# Patient Record
Sex: FEMALE | Race: WHITE | Employment: OTHER | URBAN - METROPOLITAN AREA
[De-identification: names, ages, dates, MRNs, and addresses within clinical notes are randomized per-mention and may not be internally consistent; named-entity substitution may affect disease eponyms.]

---

## 2017-01-01 ENCOUNTER — TELEPHONE (OUTPATIENT)
Dept: FAMILY MEDICINE | Facility: CLINIC | Age: 82
End: 2017-01-01

## 2017-01-01 ENCOUNTER — ANTICOAGULATION THERAPY VISIT (OUTPATIENT)
Dept: NURSING | Facility: CLINIC | Age: 82
End: 2017-01-01
Payer: MEDICARE

## 2017-01-01 ENCOUNTER — MYC MEDICAL ADVICE (OUTPATIENT)
Dept: FAMILY MEDICINE | Facility: CLINIC | Age: 82
End: 2017-01-01

## 2017-01-01 ENCOUNTER — APPOINTMENT (OUTPATIENT)
Dept: CT IMAGING | Facility: CLINIC | Age: 82
DRG: 065 | End: 2017-01-01
Attending: EMERGENCY MEDICINE
Payer: MEDICARE

## 2017-01-01 ENCOUNTER — ANTICOAGULATION THERAPY VISIT (OUTPATIENT)
Dept: NURSING | Facility: CLINIC | Age: 82
End: 2017-01-01

## 2017-01-01 ENCOUNTER — OFFICE VISIT (OUTPATIENT)
Dept: FAMILY MEDICINE | Facility: CLINIC | Age: 82
End: 2017-01-01
Payer: MEDICARE

## 2017-01-01 ENCOUNTER — HOSPITAL ENCOUNTER (INPATIENT)
Facility: CLINIC | Age: 82
LOS: 2 days | Discharge: HOSPICE | DRG: 065 | End: 2017-08-18
Attending: EMERGENCY MEDICINE | Admitting: HOSPITALIST
Payer: MEDICARE

## 2017-01-01 ENCOUNTER — TELEPHONE (OUTPATIENT)
Dept: NURSING | Facility: CLINIC | Age: 82
End: 2017-01-01

## 2017-01-01 ENCOUNTER — DOCUMENTATION ONLY (OUTPATIENT)
Dept: OTHER | Facility: CLINIC | Age: 82
End: 2017-01-01

## 2017-01-01 ENCOUNTER — TRANSFERRED RECORDS (OUTPATIENT)
Dept: HEALTH INFORMATION MANAGEMENT | Facility: CLINIC | Age: 82
End: 2017-01-01

## 2017-01-01 VITALS
OXYGEN SATURATION: 97 % | TEMPERATURE: 97.6 F | DIASTOLIC BLOOD PRESSURE: 84 MMHG | SYSTOLIC BLOOD PRESSURE: 124 MMHG | WEIGHT: 147 LBS | HEART RATE: 82 BPM | BODY MASS INDEX: 26.05 KG/M2 | HEIGHT: 63 IN

## 2017-01-01 VITALS
DIASTOLIC BLOOD PRESSURE: 80 MMHG | BODY MASS INDEX: 27.07 KG/M2 | WEIGHT: 152.75 LBS | SYSTOLIC BLOOD PRESSURE: 118 MMHG | TEMPERATURE: 97.9 F | HEIGHT: 63 IN | HEART RATE: 76 BPM | OXYGEN SATURATION: 97 %

## 2017-01-01 VITALS
DIASTOLIC BLOOD PRESSURE: 72 MMHG | RESPIRATION RATE: 16 BRPM | WEIGHT: 157 LBS | SYSTOLIC BLOOD PRESSURE: 145 MMHG | BODY MASS INDEX: 28.03 KG/M2 | OXYGEN SATURATION: 97 % | TEMPERATURE: 98.2 F

## 2017-01-01 VITALS
DIASTOLIC BLOOD PRESSURE: 80 MMHG | TEMPERATURE: 97.6 F | WEIGHT: 150.75 LBS | BODY MASS INDEX: 26.71 KG/M2 | HEIGHT: 63 IN | HEART RATE: 78 BPM | SYSTOLIC BLOOD PRESSURE: 118 MMHG | OXYGEN SATURATION: 99 %

## 2017-01-01 DIAGNOSIS — R41.3 MEMORY LOSS: Primary | ICD-10-CM

## 2017-01-01 DIAGNOSIS — J98.01 ACUTE BRONCHOSPASM: Primary | ICD-10-CM

## 2017-01-01 DIAGNOSIS — Z79.01 LONG-TERM (CURRENT) USE OF ANTICOAGULANTS: Primary | ICD-10-CM

## 2017-01-01 DIAGNOSIS — J20.9 ACUTE BRONCHITIS, UNSPECIFIED ORGANISM: Primary | ICD-10-CM

## 2017-01-01 DIAGNOSIS — M79.89 LEG SWELLING: ICD-10-CM

## 2017-01-01 DIAGNOSIS — I48.91 ATRIAL FIBRILLATION WITH RAPID VENTRICULAR RESPONSE (H): ICD-10-CM

## 2017-01-01 DIAGNOSIS — I63.9 STROKE (H): ICD-10-CM

## 2017-01-01 DIAGNOSIS — Z79.01 LONG-TERM (CURRENT) USE OF ANTICOAGULANTS: ICD-10-CM

## 2017-01-01 DIAGNOSIS — Z51.5 END OF LIFE CARE: Primary | ICD-10-CM

## 2017-01-01 DIAGNOSIS — Z53.9 DIAGNOSIS NOT YET DEFINED: Primary | ICD-10-CM

## 2017-01-01 DIAGNOSIS — R53.81 PHYSICAL DECONDITIONING: Primary | ICD-10-CM

## 2017-01-01 DIAGNOSIS — R41.3 MEMORY LOSS: ICD-10-CM

## 2017-01-01 DIAGNOSIS — I48.91 ATRIAL FIBRILLATION (H): ICD-10-CM

## 2017-01-01 DIAGNOSIS — H90.3 SENSORY HEARING LOSS, BILATERAL: ICD-10-CM

## 2017-01-01 DIAGNOSIS — E78.2 MIXED HYPERLIPIDEMIA: ICD-10-CM

## 2017-01-01 DIAGNOSIS — I48.91 ATRIAL FIBRILLATION WITH RAPID VENTRICULAR RESPONSE (H): Primary | ICD-10-CM

## 2017-01-01 DIAGNOSIS — Z71.89 ADVANCED DIRECTIVES, COUNSELING/DISCUSSION: Chronic | ICD-10-CM

## 2017-01-01 LAB
ANION GAP SERPL CALCULATED.3IONS-SCNC: 10 MMOL/L (ref 3–14)
APTT PPP: 26 SEC (ref 22–37)
BASOPHILS # BLD AUTO: 0 10E9/L (ref 0–0.2)
BASOPHILS NFR BLD AUTO: 0.2 %
BUN SERPL-MCNC: 20 MG/DL (ref 7–30)
CALCIUM SERPL-MCNC: 8.6 MG/DL (ref 8.5–10.1)
CHLORIDE SERPL-SCNC: 101 MMOL/L (ref 94–109)
CO2 SERPL-SCNC: 25 MMOL/L (ref 20–32)
CREAT SERPL-MCNC: 1.12 MG/DL (ref 0.52–1.04)
DIFFERENTIAL METHOD BLD: NORMAL
EOSINOPHIL # BLD AUTO: 0.1 10E9/L (ref 0–0.7)
EOSINOPHIL NFR BLD AUTO: 1.3 %
ERYTHROCYTE [DISTWIDTH] IN BLOOD BY AUTOMATED COUNT: 13 % (ref 10–15)
GFR SERPL CREATININE-BSD FRML MDRD: 45 ML/MIN/1.7M2
GLUCOSE SERPL-MCNC: 131 MG/DL (ref 70–99)
HCT VFR BLD AUTO: 40.7 % (ref 35–47)
HGB BLD-MCNC: 13.9 G/DL (ref 11.7–15.7)
IMM GRANULOCYTES # BLD: 0 10E9/L (ref 0–0.4)
IMM GRANULOCYTES NFR BLD: 0.2 %
INR POINT OF CARE: 2.8 (ref 0.86–1.14)
INR PPP: 1.07 (ref 0.86–1.14)
INR PPP: 1.8
INTERPRETATION ECG - MUSE: NORMAL
LYMPHOCYTES # BLD AUTO: 1.8 10E9/L (ref 0.8–5.3)
LYMPHOCYTES NFR BLD AUTO: 17 %
MCH RBC QN AUTO: 32.1 PG (ref 26.5–33)
MCHC RBC AUTO-ENTMCNC: 34.2 G/DL (ref 31.5–36.5)
MCV RBC AUTO: 94 FL (ref 78–100)
MONOCYTES # BLD AUTO: 1 10E9/L (ref 0–1.3)
MONOCYTES NFR BLD AUTO: 9.9 %
NEUTROPHILS # BLD AUTO: 7.4 10E9/L (ref 1.6–8.3)
NEUTROPHILS NFR BLD AUTO: 71.4 %
NRBC # BLD AUTO: 0 10*3/UL
NRBC BLD AUTO-RTO: 0 /100
PLATELET # BLD AUTO: 219 10E9/L (ref 150–450)
POTASSIUM SERPL-SCNC: 3.8 MMOL/L (ref 3.4–5.3)
RBC # BLD AUTO: 4.33 10E12/L (ref 3.8–5.2)
SODIUM SERPL-SCNC: 136 MMOL/L (ref 133–144)
WBC # BLD AUTO: 10.3 10E9/L (ref 4–11)

## 2017-01-01 PROCEDURE — 70460 CT HEAD/BRAIN W/DYE: CPT

## 2017-01-01 PROCEDURE — G0180 MD CERTIFICATION HHA PATIENT: HCPCS | Performed by: FAMILY MEDICINE

## 2017-01-01 PROCEDURE — 99223 1ST HOSP IP/OBS HIGH 75: CPT | Mod: AI | Performed by: HOSPITALIST

## 2017-01-01 PROCEDURE — 25000128 H RX IP 250 OP 636: Performed by: EMERGENCY MEDICINE

## 2017-01-01 PROCEDURE — A9270 NON-COVERED ITEM OR SERVICE: HCPCS | Mod: GY | Performed by: HOSPITALIST

## 2017-01-01 PROCEDURE — 12000000 ZZH R&B MED SURG/OB

## 2017-01-01 PROCEDURE — 99214 OFFICE O/P EST MOD 30 MIN: CPT | Performed by: FAMILY MEDICINE

## 2017-01-01 PROCEDURE — 99231 SBSQ HOSP IP/OBS SF/LOW 25: CPT | Performed by: INTERNAL MEDICINE

## 2017-01-01 PROCEDURE — 85610 PROTHROMBIN TIME: CPT | Performed by: EMERGENCY MEDICINE

## 2017-01-01 PROCEDURE — 99291 CRITICAL CARE FIRST HOUR: CPT | Mod: 25

## 2017-01-01 PROCEDURE — 99215 OFFICE O/P EST HI 40 MIN: CPT | Performed by: FAMILY MEDICINE

## 2017-01-01 PROCEDURE — 25000132 ZZH RX MED GY IP 250 OP 250 PS 637: Mod: GY | Performed by: HOSPITALIST

## 2017-01-01 PROCEDURE — 80048 BASIC METABOLIC PNL TOTAL CA: CPT | Performed by: EMERGENCY MEDICINE

## 2017-01-01 PROCEDURE — 99207 ZZC NO CHARGE NURSE ONLY: CPT

## 2017-01-01 PROCEDURE — 25000125 ZZHC RX 250: Performed by: EMERGENCY MEDICINE

## 2017-01-01 PROCEDURE — 93005 ELECTROCARDIOGRAM TRACING: CPT

## 2017-01-01 PROCEDURE — 70470 CT HEAD/BRAIN W/O & W/DYE: CPT | Mod: XS

## 2017-01-01 PROCEDURE — 85025 COMPLETE CBC W/AUTO DIFF WBC: CPT | Performed by: EMERGENCY MEDICINE

## 2017-01-01 PROCEDURE — 70498 CT ANGIOGRAPHY NECK: CPT

## 2017-01-01 PROCEDURE — 99238 HOSP IP/OBS DSCHRG MGMT 30/<: CPT | Performed by: INTERNAL MEDICINE

## 2017-01-01 PROCEDURE — 99207 ZZC NO CHARGE NURSE ONLY: CPT | Performed by: FAMILY MEDICINE

## 2017-01-01 PROCEDURE — 36416 COLLJ CAPILLARY BLOOD SPEC: CPT

## 2017-01-01 PROCEDURE — A9270 NON-COVERED ITEM OR SERVICE: HCPCS | Mod: GY | Performed by: EMERGENCY MEDICINE

## 2017-01-01 PROCEDURE — 85610 PROTHROMBIN TIME: CPT | Mod: QW

## 2017-01-01 PROCEDURE — 25000132 ZZH RX MED GY IP 250 OP 250 PS 637: Mod: GY | Performed by: EMERGENCY MEDICINE

## 2017-01-01 PROCEDURE — 85730 THROMBOPLASTIN TIME PARTIAL: CPT | Performed by: EMERGENCY MEDICINE

## 2017-01-01 RX ORDER — ATROPINE SULFATE 10 MG/ML
1-2 SOLUTION/ DROPS OPHTHALMIC
Status: DISCONTINUED | OUTPATIENT
Start: 2017-01-01 | End: 2017-01-01 | Stop reason: HOSPADM

## 2017-01-01 RX ORDER — HALOPERIDOL 2 MG/ML
.5-1 SOLUTION ORAL EVERY 6 HOURS PRN
Qty: 30 ML | Refills: 1 | DISCHARGE
Start: 2017-01-01

## 2017-01-01 RX ORDER — FUROSEMIDE 20 MG
10 TABLET ORAL EVERY MORNING
Qty: 45 TABLET | Refills: 0 | Status: SHIPPED | OUTPATIENT
Start: 2017-01-01 | End: 2017-01-01

## 2017-01-01 RX ORDER — TRIAMCINOLONE ACETONIDE 1 MG/G
CREAM TOPICAL 3 TIMES DAILY PRN
COMMUNITY

## 2017-01-01 RX ORDER — ATROPINE SULFATE 10 MG/ML
2-4 SOLUTION/ DROPS OPHTHALMIC
Qty: 5 ML | Refills: 1 | DISCHARGE
Start: 2017-01-01

## 2017-01-01 RX ORDER — LORAZEPAM 2 MG/ML
.5-1 INJECTION INTRAMUSCULAR
Status: DISCONTINUED | OUTPATIENT
Start: 2017-01-01 | End: 2017-01-01 | Stop reason: HOSPADM

## 2017-01-01 RX ORDER — NALOXONE HYDROCHLORIDE 0.4 MG/ML
.1-.4 INJECTION, SOLUTION INTRAMUSCULAR; INTRAVENOUS; SUBCUTANEOUS
Status: DISCONTINUED | OUTPATIENT
Start: 2017-01-01 | End: 2017-01-01

## 2017-01-01 RX ORDER — ASPIRIN 300 MG/1
300 SUPPOSITORY RECTAL ONCE
Status: COMPLETED | OUTPATIENT
Start: 2017-01-01 | End: 2017-01-01

## 2017-01-01 RX ORDER — ACETAMINOPHEN 650 MG/1
650 SUPPOSITORY RECTAL EVERY 4 HOURS PRN
Status: DISCONTINUED | OUTPATIENT
Start: 2017-01-01 | End: 2017-01-01 | Stop reason: HOSPADM

## 2017-01-01 RX ORDER — ONDANSETRON 2 MG/ML
4 INJECTION INTRAMUSCULAR; INTRAVENOUS EVERY 6 HOURS PRN
Status: DISCONTINUED | OUTPATIENT
Start: 2017-01-01 | End: 2017-01-01 | Stop reason: HOSPADM

## 2017-01-01 RX ORDER — LORAZEPAM 2 MG/ML
.25-.5 CONCENTRATE ORAL EVERY 4 HOURS PRN
Qty: 30 ML | Refills: 1 | Status: SHIPPED | DISCHARGE
Start: 2017-01-01

## 2017-01-01 RX ORDER — ALBUTEROL SULFATE 0.83 MG/ML
2.5 SOLUTION RESPIRATORY (INHALATION)
Status: DISCONTINUED | OUTPATIENT
Start: 2017-01-01 | End: 2017-01-01 | Stop reason: HOSPADM

## 2017-01-01 RX ORDER — IOPAMIDOL 755 MG/ML
120 INJECTION, SOLUTION INTRAVASCULAR ONCE
Status: COMPLETED | OUTPATIENT
Start: 2017-01-01 | End: 2017-01-01

## 2017-01-01 RX ORDER — FUROSEMIDE 20 MG
10 TABLET ORAL EVERY MORNING
Qty: 45 TABLET | Refills: 0 | Status: ON HOLD | OUTPATIENT
Start: 2017-01-01 | End: 2017-01-01

## 2017-01-01 RX ORDER — ACETAMINOPHEN 325 MG/1
650 TABLET ORAL EVERY 4 HOURS PRN
Status: DISCONTINUED | OUTPATIENT
Start: 2017-01-01 | End: 2017-01-01 | Stop reason: HOSPADM

## 2017-01-01 RX ORDER — PROCHLORPERAZINE MALEATE 5 MG
5 TABLET ORAL EVERY 6 HOURS PRN
Status: DISCONTINUED | OUTPATIENT
Start: 2017-01-01 | End: 2017-01-01 | Stop reason: HOSPADM

## 2017-01-01 RX ORDER — LORAZEPAM 0.5 MG/1
.5-1 TABLET ORAL
Status: DISCONTINUED | OUTPATIENT
Start: 2017-01-01 | End: 2017-01-01 | Stop reason: HOSPADM

## 2017-01-01 RX ORDER — ONDANSETRON 4 MG/1
4 TABLET, ORALLY DISINTEGRATING ORAL EVERY 6 HOURS PRN
Status: DISCONTINUED | OUTPATIENT
Start: 2017-01-01 | End: 2017-01-01 | Stop reason: HOSPADM

## 2017-01-01 RX ORDER — NALOXONE HYDROCHLORIDE 0.4 MG/ML
.1-.4 INJECTION, SOLUTION INTRAMUSCULAR; INTRAVENOUS; SUBCUTANEOUS
Status: DISCONTINUED | OUTPATIENT
Start: 2017-01-01 | End: 2017-01-01 | Stop reason: HOSPADM

## 2017-01-01 RX ORDER — ACETAMINOPHEN 650 MG/1
650 SUPPOSITORY RECTAL EVERY 4 HOURS PRN
Qty: 12 SUPPOSITORY | Refills: 1 | DISCHARGE
Start: 2017-01-01

## 2017-01-01 RX ORDER — ATORVASTATIN CALCIUM 40 MG/1
40 TABLET, FILM COATED ORAL DAILY
Qty: 90 TABLET | Refills: 1 | Status: ON HOLD | OUTPATIENT
Start: 2017-01-01 | End: 2017-01-01

## 2017-01-01 RX ORDER — METOPROLOL TARTRATE 50 MG
100 TABLET ORAL 2 TIMES DAILY
Qty: 360 TABLET | Refills: 1 | Status: ON HOLD | OUTPATIENT
Start: 2017-01-01 | End: 2017-01-01

## 2017-01-01 RX ORDER — ACETAMINOPHEN 325 MG/1
325-650 TABLET ORAL EVERY 4 HOURS PRN
Qty: 100 TABLET | Refills: 1 | DISCHARGE
Start: 2017-01-01

## 2017-01-01 RX ORDER — BISACODYL 10 MG
10 SUPPOSITORY, RECTAL RECTAL DAILY PRN
Status: DISCONTINUED | OUTPATIENT
Start: 2017-01-01 | End: 2017-01-01 | Stop reason: HOSPADM

## 2017-01-01 RX ORDER — AZITHROMYCIN 250 MG/1
TABLET, FILM COATED ORAL
Qty: 6 TABLET | Refills: 0 | Status: ON HOLD | OUTPATIENT
Start: 2017-01-01 | End: 2017-01-01

## 2017-01-01 RX ORDER — HYDROMORPHONE HYDROCHLORIDE 1 MG/ML
.3-.5 INJECTION, SOLUTION INTRAMUSCULAR; INTRAVENOUS; SUBCUTANEOUS
Status: DISCONTINUED | OUTPATIENT
Start: 2017-01-01 | End: 2017-01-01 | Stop reason: HOSPADM

## 2017-01-01 RX ORDER — HYDROMORPHONE HYDROCHLORIDE 1 MG/ML
1-2 SOLUTION ORAL
Qty: 30 ML | Refills: 0 | Status: SHIPPED | DISCHARGE
Start: 2017-01-01

## 2017-01-01 RX ORDER — OXYCODONE HCL 20 MG/ML
5-7.5 CONCENTRATE, ORAL ORAL
Status: DISCONTINUED | OUTPATIENT
Start: 2017-01-01 | End: 2017-01-01 | Stop reason: HOSPADM

## 2017-01-01 RX ORDER — PROCHLORPERAZINE 25 MG
12.5 SUPPOSITORY, RECTAL RECTAL EVERY 12 HOURS PRN
Status: DISCONTINUED | OUTPATIENT
Start: 2017-01-01 | End: 2017-01-01 | Stop reason: HOSPADM

## 2017-01-01 RX ORDER — ALBUTEROL SULFATE 90 UG/1
2 AEROSOL, METERED RESPIRATORY (INHALATION) EVERY 6 HOURS PRN
Qty: 1 INHALER | Refills: 0 | Status: SHIPPED | OUTPATIENT
Start: 2017-01-01

## 2017-01-01 RX ORDER — DILTIAZEM HYDROCHLORIDE 60 MG/1
60 CAPSULE, EXTENDED RELEASE ORAL 2 TIMES DAILY
Qty: 180 CAPSULE | Refills: 0 | Status: ON HOLD | OUTPATIENT
Start: 2017-01-01 | End: 2017-01-01

## 2017-01-01 RX ORDER — SALIVA STIMULANT COMB. NO.3
1 SPRAY, NON-AEROSOL (ML) MUCOUS MEMBRANE
Status: DISCONTINUED | OUTPATIENT
Start: 2017-01-01 | End: 2017-01-01 | Stop reason: HOSPADM

## 2017-01-01 RX ADMIN — IOPAMIDOL 120 ML: 755 INJECTION, SOLUTION INTRAVENOUS at 23:13

## 2017-01-01 RX ADMIN — ASPIRIN 300 MG: 300 SUPPOSITORY RECTAL at 00:52

## 2017-01-01 RX ADMIN — SODIUM CHLORIDE 100 ML: 9 INJECTION, SOLUTION INTRAVENOUS at 23:12

## 2017-01-01 RX ADMIN — Medication 5 MG: at 22:38

## 2017-01-04 PROBLEM — R41.3 MEMORY LOSS: Status: ACTIVE | Noted: 2017-01-01

## 2017-01-04 NOTE — PROGRESS NOTES
SUBJECTIVE:                                                    Gisela Abad is a 89 year old female who presents to clinic today for the following health issues:    Discuss Hospice/Forgetful       Duration: 3 months ago    Description (location/character/radiation): pt's son states pt is having more confusion, sleeping more    Accompanying signs and symptoms: has to have meals prepared for her and forgetting to take medications    Would like to discuss Hospice      Family prepares meal. Lately significantly forgetful.  Per patient - does not like to do activities on certain days.    When family was out - it was very stressful for her as family provides her meals and she has hard time preparing her own meals.    Her son and daughter in law are primary caretakers. They have discussed assisted living facility but she declined. Patient sometime have thoughts of better of dead or life is not worth living thoughts. Does not remember to take meds on regular basis and family has to remind her.    Here with her son - per son - gets agitated easily. Needs frequent calls to take her meds. Sometime morning pills are taken during evening hours. Generally still takes pills on daily basis as son sets up in pill box.   They have talked to  at [facility] and they were thinking about hospice but patient is not in agreement. At [facility] there is no assisted living facility or other facility. She has been living at [facility] for 18 yrs and she wishes to stick to same.   Family arranged an aid for her to visit her twice per day.                                                                                                                                                                                          Problem list and histories reviewed & adjusted, as indicated.  Additional history: as documented    Problem list, Medication list, Allergies, and Medical/Social/Surgical histories reviewed in [facility] and  "updated as appropriate.    Social History     Social History     Marital Status:      Spouse Name: N/A     Number of Children: N/A     Years of Education: N/A     Social History Main Topics     Smoking status: Never Smoker      Smokeless tobacco: Never Used     Alcohol Use: Yes      Comment: rare     Drug Use: No     Sexual Activity: Not Currently     Other Topics Concern     Parent/Sibling W/ Cabg, Mi Or Angioplasty Before 65f 55m? No     Social History Narrative     Allergies   Allergen Reactions     Amoxicillin      Sulfa Drugs      Patient Active Problem List   Diagnosis     Mixed hyperlipidemia     Essential hypertension, benign     Esophageal reflux     Atrial fibrillation with rapid ventricular response (H)     Elevated troponin     Atrial fibrillation (H)     Osteoporosis     Mixed hearing loss     Sensory hearing loss, bilateral     Long-term (current) use of anticoagulants [Z79.01]     Reviewed medications, social history and  past medical and surgical history.    Review of system: for general, respiratory, CVS, GI and psychiatry negative except for noted above.     EXAM:  /80 mmHg  Pulse 78  Temp(Src) 97.6  F (36.4  C) (Oral)  Ht 5' 2.75\" (1.594 m)  Wt 150 lb 12 oz (68.38 kg)  BMI 26.91 kg/m2  SpO2 99%  Constitutional: healthy, alert and no distress   Psychiatric: somewhat confused but pleasant.   Neuro - alert, oriented to place.    Can not recall any of 3 words recall, unable to identify date, day, year. Unable to tell how many pills she takes but stated she takes twice per day from pill box.     ASSESSMENT / PLAN:  (R41.3) Memory loss  (primary encounter diagnosis)  Comment: it is fortunate that she has a great family support but I do believe she needs more help and her son wants same thing for her. I discussed at assisted living facility she would still have most of her independence but she may need more monitoring specially with declining of her function and ADL. We also discussed " about hearing loss, underlying occult depression can contribute to her memory loss and we discussed various plans including moving to assisted living or cutting down her meds (coumadin specifically) to improve compliance and avoid complications, involvement of care coordinator,  etc, we talked about hospice too.   Plan: she is not quiet sure about any of the options. She did get information about assisted living which I think would be a great option for her but as per son she is not too excited. I will see her back in a month to make sure she is doing OK. I do worry about her living alone at home. Son and daughter in law visits her frequently and an aid is visiting her twice per day too.     (I48.91) Atrial fibrillation with rapid ventricular response (H)  Comment:    Plan: on coumadin. See above. May need to stop it to avoid complications.     (H90.3) Sensory hearing loss, bilateral  Comment:    Plan: can worsen her mentation. Should continue to use hearing aids which she does not on regular basis as per son.     (Z79.01) Long-term (current) use of anticoagulants [Z79.01]  Comment:     Plan:      Follow up in a month.    I spent > 40 minutes and more than 1/2 of the time was in counselling and coordination of care regarding above mentioned issues.

## 2017-01-04 NOTE — MR AVS SNAPSHOT
After Visit Summary   1/4/2017    Gisela Abad    MRN: [id number]           Patient Information     Date Of Birth          12/12/1924        Visit Information        Provider Department      1/4/2017 9:40 AM Mike Kwan MD [facility]         Follow-ups after your visit        Your next 10 appointments already scheduled     Jan 09, 2017 11:15 AM   Anticoagulation Visit with  INR CLINIC   [facility]    [address] [place] [id number] [phone]            Feb 03, 2017  9:40 AM   Office Visit with Mike Kwan MD   [facility]    [address] [place] [id number] [phone]           Bring a current list of meds and any records pertaining to this visit.  For Physicals, please bring immunization records and any forms needing to be filled out.  Please arrive 10 minutes early to complete paperwork.              Who to contact     If you have questions or need follow up information about today's clinic visit or your schedule please contact [facility] directly at [phone].  Normal or non-critical lab and imaging results will be communicated to you by MyChart, letter or phone within 4 business days after the clinic has received the results. If you do not hear from us within 7 days, please contact the clinic through [employer] or phone. If you have a critical or abnormal lab result, we will notify you by phone as soon as possible.  Submit refill requests through [employer] or call your pharmacy and they will forward the refill request to us. Please allow 3 business days for your refill to be completed.          Additional Information About Your Visit        [employer] Information     [employer] gives you secure access to your electronic health record. If you see a primary care provider, you can also send messages to your care  "team and make appointments. If you have questions, please call your primary care clinic.  If you do not have a primary care provider, please call [phone] and they will assist you.        Care EveryWhere ID     This is your Care EveryWhere ID. This could be used by other organizations to access your [facility] medical records  [id number]        Your Vitals Were     Pulse Temperature Height BMI (Body Mass Index) Pulse Oximetry       78 97.6  F (36.4  C) (Oral) 5' 2.75\" (1.594 m) 26.91 kg/m2 99%        Blood Pressure from Last 3 Encounters:   01/04/17 118/80   09/23/16 118/66   08/11/16 126/77    Weight from Last 3 Encounters:   01/04/17 150 lb 12 oz (68.38 kg)   09/23/16 153 lb (69.4 kg)   08/11/16 156 lb (70.761 kg)              Today, you had the following     No orders found for display       Primary Care Provider Office Phone # Fax #    Mike Maria Teresa Kwan [phone]       [address] [address]        Thank you!     Thank you for choosing [facility]  for your care. Our goal is always to provide you with excellent care. Hearing back from our patients is one way we can continue to improve our services. Please take a few minutes to complete the written survey that you may receive in the mail after your visit with us. Thank you!             Your Updated Medication List - Protect others around you: Learn how to safely use, store and throw away your medicines at www.disposemymeds.org.          This list is accurate as of: 1/4/17 10:19 AM.  Always use your most recent med list.                   Brand Name Dispense Instructions for use    aspirin 81 MG tablet     100    1 tab po QD (Once per day)       atorvastatin 40 MG tablet    LIPITOR    90 tablet    Take 1 tablet (40 mg) by mouth daily       CENTRUM SILVER per tablet      Take 1 tablet by mouth daily       diltiazem 60 MG 12 hr capsule    CARDIZEM SR    180 capsule    Take 1 " capsule (60 mg) by mouth 2 times daily       furosemide 20 MG tablet    LASIX    45 tablet    Take 0.5 tablets (10 mg) by mouth every morning       metoprolol 50 MG tablet    LOPRESSOR    360 tablet    Take 2 tablets (100 mg) by mouth 2 times daily       triamcinolone 0.1 % cream    KENALOG    30 g    Apply sparingly to affected area three times daily for 14 days.       VITAMIN D PO          warfarin 2 MG tablet    COUMADIN    90 tablet    Take as directed by Anticoagulation Clinic. (Current dose: 1 mg on Fri; 2 mg all other days)

## 2017-01-04 NOTE — NURSING NOTE
"Chief Complaint   Patient presents with     Home Care/Hospice     discuss        Initial /80 mmHg  Pulse 78  Temp(Src) 97.6  F (36.4  C) (Oral)  Ht 5' 2.75\" (1.594 m)  Wt 150 lb 12 oz (68.38 kg)  BMI 26.91 kg/m2  SpO2 99% Estimated body mass index is 26.91 kg/(m^2) as calculated from the following:    Height as of this encounter: 5' 2.75\" (1.594 m).    Weight as of this encounter: 150 lb 12 oz (68.38 kg).  BP completed using cuff size: jayy Olivares CMA      "

## 2017-01-09 NOTE — PROGRESS NOTES
ANTICOAGULATION FOLLOW-UP CLINIC VISIT    Patient Name:  Gisela Abad  Date:  1/9/2017  Contact Type:  Face to Face    SUBJECTIVE:     Patient Findings     Positives Missed doses (Missed a dose last week, son believes it was 1/5/17)    Comments Rosalio Loredo is working on getting homecare services for patient. Likely HC team will be out next week to assess care needed and obtain an INR level.            OBJECTIVE    INR PROTIME   Date Value Ref Range Status   01/09/2017 2.8* 0.86 - 1.14 Final       ASSESSMENT / PLAN  INR assessment THER    Recheck INR In: 3 WEEKS    INR Location Clinic      Anticoagulation Summary as of 1/9/2017     INR goal 2.0-3.0   Selected INR 2.8 (1/9/2017)   Maintenance plan 1 mg (2 mg x 0.5) on Mon, Fri; 2 mg (2 mg x 1) all other days   Full instructions 1 mg on Mon, Fri; 2 mg all other days   Weekly total 12 mg   Plan last modified Maricarmen Brown RN (1/9/2017)   Next INR check 1/30/2017   Target end date     Indications   Long-term (current) use of anticoagulants [Z79.01] [Z79.01]  Atrial fibrillation (H) [I48.91]  Atrial fibrillation with rapid ventricular response (H) [I48.91]         Anticoagulation Episode Summary     INR check location     Preferred lab     Send INR reminders to [facility]    Comments Rosalio LOREDO brings pt to appts & sets up pill box.      Anticoagulation Care Providers     Provider Role Specialty Phone number    Mike Kwan MD [facility] [phone]            See the Encounter Report to view Anticoagulation Flowsheet and Dosing Calendar (Go to Encounters tab in chart review, and find the Anticoagulation Therapy Visit)    INR 2.8 on weekly dose of likely 11 mg today. Patient advised to try new weekly dose of 12 mg instead of 13 mg (slight reduction).     Damian and patient aware if signs of clotting (pain, tenderness, swelling, color change in leg or arm, SOB) and bleeding occur (blood in stool, urine, large bruising, bleeding  gums, nosebleeds) to have INR check sooner. If sx severe report to ER or concerned for stroke call 911. If general questions or concerns arise, call clinic.      Maricarmen Brown RN

## 2017-01-09 NOTE — MR AVS SNAPSHOT
Giselawing Abad   1/9/2017 11:15 AM   Anticoagulation Therapy Visit    Description:  89 year old female   Provider:   INR CLINIC   Department:   Nurse           INR as of 1/9/2017     Selected INR 2.8 (1/9/2017)      Anticoagulation Summary as of 1/9/2017     INR goal 2.0-3.0   Selected INR 2.8 (1/9/2017)   Full instructions 1 mg on Mon, Fri; 2 mg all other days   Next INR check 1/30/2017    Indications   Long-term (current) use of anticoagulants [Z79.01] [Z79.01]  Atrial fibrillation (H) [I48.91]  Atrial fibrillation with rapid ventricular response (H) [I48.91]         Your next Anticoagulation Clinic appointment(s)     Jan 30, 2017 11:30 AM   Anticoagulation Visit with  INR CLINIC   [facility] ([facility])    [address] [place] [address] [phone]              Contact Numbers     [facility]  Please call [phone] to cancel and/or reschedule your appointment   Please call [phone] with any problems or questions regarding your therapy.        January 2017 Details    Sun Mon Tue Wed Thu Fri Sat     1               2               3               4               5               6               7                 8               9      1 mg   See details      10      2 mg         11      2 mg         12      2 mg         13      1 mg         14      2 mg           15      2 mg         16      1 mg         17      2 mg         18      2 mg         19      2 mg         20      1 mg         21      2 mg           22      2 mg         23      1 mg         24      2 mg         25      2 mg         26      2 mg         27      1 mg         28      2 mg           29      2 mg         30            31                    Date Details   01/09 This INR check       Date of next INR:  1/30/2017         How to take your warfarin dose     To take:  1 mg Take 0.5 of a 2 mg tablet.    To take:  2 mg Take 1 of the 2 mg tablets.

## 2017-01-09 NOTE — TELEPHONE ENCOUNTER
Reason for Call: Request for an order or referral:    Order or referral being requested: Shayla from  Hospice is calling stating they just met with Pt and she doesn't fit the criteria for Hospice. She is wanting a home care referral for PT/OT/Speech/Nursing to determine pt's Cognitive function and safety at home.    Date needed: as soon as possible    Has the patient been seen by the PCP for this problem? YES    Additional comments: call Shayla at [phone]    Phone number Patient can be reached at:  NA    Best Time:  anytime    Can we leave a detailed message on this number?  YES    Call taken on 1/9/2017 at 10:37 AM by Noy Hurtado

## 2017-01-10 NOTE — TELEPHONE ENCOUNTER
Reporting that Patient took her morning meds for Tuesday and then also took her Tuesday and Wednesday evening meds this morning.  Is doing fine  Left the Wednesday morning pills in the box  OT will work on a plan for medications to try to avoid a recurrence.  Son is also aware  Patient is feeling fine.  Authorization for visits given  Faviola Chang RN

## 2017-01-10 NOTE — TELEPHONE ENCOUNTER
Kathryn  Home care nurse called requesting orders for skilled nursing 2x wk for 2 wks, 1x wk for 2 wks     Then PT/OT & social work eval for memory loss     Kathryn also reports patient took morning pills today, also wed & Thursday meds too of Metoprolol & Cardizem but feeling fine     Please advise

## 2017-01-10 NOTE — TELEPHONE ENCOUNTER
Patient has been opened to home care  If INR needs to be drawn they can have the home care nurse draw if you like.  Please let Kathryn know if and when you need an INR done by home care.  Faviola Chang RN

## 2017-01-10 NOTE — TELEPHONE ENCOUNTER
Kathryn, HC RN called back and advised to check INRs with HC visits as advised. Writer informed RN of patient's current weekly warfarin dose.   INR is not needed until 1/23 (preferred) or 1/30. Encouraged callback with date for next INR. Maricarmen Brown, SHADEN, RN

## 2017-01-13 NOTE — TELEPHONE ENCOUNTER
It is writer's understanding this is an automatic medication pill dispenser.    DME order pended.    Routing to PCP.    Dr. Kwan-Please review.  Please print and sign if agree.    Thank you!  SHADE BarajasN, RN

## 2017-01-13 NOTE — TELEPHONE ENCOUNTER
"Please call Mami at [facility] at [phone].  Patient missed evening dose of her meds last night.  Wants order for \"MD2 medication dispenser\".  "

## 2017-01-16 NOTE — TELEPHONE ENCOUNTER
Called and spoke to Mami and she will call back with facility fax number to fax DME.   Put DME back in MA basket in [place].       Fernanda Olivares, CMA

## 2017-01-23 NOTE — TELEPHONE ENCOUNTER
Prescription approved per [facility] Refill Protocol.  Refill x 6 months as previous.    Jamaica Rapp, [facility]  [phone]

## 2017-01-23 NOTE — TELEPHONE ENCOUNTER
Metoprolol 50mg      Last Written Prescription Date: 7/19/16  Last Fill Quantity: 360, # refills: 1    Last Office Visit with G, P or [facility] prescribing provider:  1/4/17   Future Office Visit:    Next 5 appointments (look out 90 days)     Feb 03, 2017  9:40 AM   Office Visit with Mike Kwan MD   [facility] ([facility])    [address] [place] [address] [phone]                    BP Readings from Last 3 Encounters:   01/04/17 118/80   09/23/16 118/66   08/11/16 126/77       Manny Cheung CPhT  [facility]    On behalf of [facility]

## 2017-01-24 NOTE — TELEPHONE ENCOUNTER
Faxing LINDSAY for MD2 dispenser to [facility] [phone]    Fernanda Olivares, CMA

## 2017-01-24 NOTE — PROGRESS NOTES
ANTICOAGULATION FOLLOW-UP CLINIC VISIT    Patient Name:  Gisela Abad  Date:  1/24/2017  Contact Type:  Telephone/ LILA Yee (#[phone])    SUBJECTIVE:     Patient Findings     Positives Med error (Patient had a dosing error a few weeks ago. Now sonFacundo is setting up pills. In the last 7 days patient took Warfarin correctly.), Unexplained INR or factor level change           OBJECTIVE    INR   Date Value Ref Range Status   01/24/2017 1.8  Corrected     Comment:     [facility]       ASSESSMENT / PLAN  INR assessment SUB    Recheck INR In: 2 WEEKS    INR Location Homecare INR      Anticoagulation Summary as of 1/24/2017     INR goal 2.0-3.0   Selected INR 1.8! (1/24/2017)   Maintenance plan 1 mg (2 mg x 0.5) on Mon; 2 mg (2 mg x 1) all other days   Full instructions 1 mg on Mon; 2 mg all other days   Weekly total 13 mg   Plan last modified Maricarmen Brown RN (1/24/2017)   Next INR check 2/7/2017   Priority INR   Target end date     Indications   Long-term (current) use of anticoagulants [Z79.01] [Z79.01]  Atrial fibrillation (H) [I48.91]  Atrial fibrillation with rapid ventricular response (H) [I48.91]         Anticoagulation Episode Summary     INR check location     Preferred lab     Send INR reminders to  [facility]    Comments Son FACUNDO brings pt to appts & sets up pill box.      Anticoagulation Care Providers     Provider Role Specialty Phone number    Mike Kwan MD [facility] [phone]            See the Encounter Report to view Anticoagulation Flowsheet and Dosing Calendar (Go to Encounters tab in chart review, and find the Anticoagulation Therapy Visit)    Writer was on speaker phone during HC visit. Patient, son and HC RN advised new dosing plan as outlined on calendar. Recheck in 2 weeks to ensure dose increase is tolerated.     Made all aware if signs of clotting (pain, tenderness, swelling, color change in leg or arm, SOB) and bleeding occur (blood in  stool, urine, large bruising, bleeding gums, nosebleeds) to have INR check sooner. If sx severe report to ER or concerned for stroke call 911. If general questions or concerns arise, call clinic.    Maricarmen Brown RN

## 2017-02-03 NOTE — PROGRESS NOTES
SUBJECTIVE:                                                    Gisela Abad is a 89 year old female who presents to clinic today for the following health issues:    Here with her son.    Per patient:   Cant do what she used to do. Limiting her daily life. Unable to go to places where she enjoys.     Son does all the activities for her.     Looking for assisted living facility and memory care.     Missing meds and now son gives her meds.     She feels she is ready if she is going to die.     Per son - missing doses or takes more than once and they are handing out her meds. Sometime she forgets to eat. Does not cook. They are concerned about her personal hygiene. On wait list for [facility] for assisted living/memory care. Now accepting her move, previously was resistant.     Problem list and histories reviewed & adjusted, as indicated.  Additional history: as documented    Problem list, Medication list, Allergies, and Medical/Social/Surgical histories reviewed in [facility] and updated as appropriate.      Social History     Social History     Marital Status:      Spouse Name: N/A     Number of Children: N/A     Years of Education: N/A     Social History Main Topics     Smoking status: Never Smoker      Smokeless tobacco: Never Used     Alcohol Use: Yes      Comment: rare     Drug Use: No     Sexual Activity:     Partners: Male     Birth Control/ Protection: Abstinence     Other Topics Concern     Parent/Sibling W/ Cabg, Mi Or Angioplasty Before 65f 55m? No     Social History Narrative     Allergies   Allergen Reactions     Amoxicillin      Sulfa Drugs      Patient Active Problem List   Diagnosis     Mixed hyperlipidemia     Essential hypertension, benign     Esophageal reflux     Atrial fibrillation with rapid ventricular response (H)     Elevated troponin     Atrial fibrillation (H)     Osteoporosis     Mixed hearing loss     Sensory hearing loss, bilateral     Long-term (current) use of  "anticoagulants [Z79.01]     Memory loss     Reviewed medications, social history and  past medical and surgical history.    Review of system: for general, respiratory, CVS, GI and psychiatry negative except for noted above.     EXAM:  /80 mmHg  Pulse 76  Temp(Src) 97.9  F (36.6  C) (Oral)  Ht 5' 2.75\" (1.594 m)  Wt 152 lb 12 oz (69.287 kg)  BMI 27.27 kg/m2  SpO2 97%  Constitutional: healthy, alert and no distress   Psychiatric: confused but pleasant.     ASSESSMENT / PLAN:  (R41.3) Memory loss  (primary encounter diagnosis)  Comment: today we spent our entire visit discussing her care goal. We discussed memory loss meds but I am not sure if there is long term benefit with with it specially when she is having trouble with her current meds. Underlying depression can not be ruled out but less likely from their history.   We discussed complications with coumadin if it is not used properly. We discussed we can make informed decision about stopping it as long as they understand possible risk with it. We talked at length about too high of INR and its complications from taking coumadin inadvertent or not taking it at all and put her at risk for complications due to blood clot. She repeatedly states in different wording that she wishes no heroic efforts to save her. She declined any IV drugs, antibiotics, hospitalization. She wants DNR/ DNI and wishes to stay at her place if any complication arises. She also wishes to stop coumadin and her son also agreed.   We talked about her other meds, she is on board to continue those for now but she will have discussion with her family about it  Plan: stop coumadin. Let me know if she changes her mind about her other meds. As long as she understands possible risk involved including death specially with her afib rate controller meds, I am comfortable stopping those meds.   Signed and discussed  POLST and copy in abstraction.     (Z79.01) Long-term (current) use of " anticoagulants [Z79.01]  Comment: stop coumadin.   Plan:      (I48.91) Atrial fibrillation with rapid ventricular response (H)  Comment:    Plan:  See above.    I spent > 40 minutes and more than 1/2 of the time was in counselling and coordination of care regarding above mentioned issues.

## 2017-02-03 NOTE — NURSING NOTE
"Chief Complaint   Patient presents with     Other     discuss end of life       Initial /80 mmHg  Pulse 76  Temp(Src) 97.9  F (36.6  C) (Oral)  Ht 5' 2.75\" (1.594 m)  Wt 152 lb 12 oz (69.287 kg)  BMI 27.27 kg/m2  SpO2 97% Estimated body mass index is 27.27 kg/(m^2) as calculated from the following:    Height as of this encounter: 5' 2.75\" (1.594 m).    Weight as of this encounter: 152 lb 12 oz (69.287 kg).  BP completed using cuff size: jayy Olivares CMA      "

## 2017-02-03 NOTE — PROGRESS NOTES
"SUBJECTIVE:                                                            Gisela Abad is a 89 year old female who presents for Preventive Visit.  {PVP to remind patient that this is not necessarily a physical exam; physical exam may or may not be done:324509::\"click delete button to remove this line now\"}  {PVP to inform patient that additional E&M charge may apply, if additional problems addressed:608350::\"click delete button to remove this line now\"}  Are you in the first 12 months of your Medicare coverage?  {No Yes:271695::\"No\"}    Physical  Annual:     Getting at least 3 servings of Calcium per day::  Yes    Bi-annual eye exam::  NO    Dental care twice a year::  Yes    Sleep apnea or symptoms of sleep apnea::  Daytime drowsiness    Diet::  Regular (no restrictions)    Frequency of exercise::  None    Taking medications regularly::  No    Barriers to taking medications::  Problems remembering to take them    Additional concerns today::  YES      {AWV Cognitive Screenin}    All Histories reviewed and updated in [facility] as appropriate.  Social History   Substance Use Topics     Smoking status: Never Smoker      Smokeless tobacco: Never Used     Alcohol Use: Yes      Comment: rare       {ETOH AUDIT:544675}    {Outside tests to abstract? :324469}    {additional problems to add:925721}    Today's PHQ-2 Score:   PHQ-2 (  [employer]) 2017   Q1: Little interest or pleasure in doing things -   Q2: Feeling down, depressed or hopeless -   PHQ-2 Score -   Little interest or pleasure in doing things Several days   Feeling down, depressed or hopeless Several days   PHQ-2 Score 2       Do you feel safe in your environment - {YES/NO/NA:181304}    Do you have a Health Care Directive?: {HEALTHCARE DIRECTIVE STATUS:567400}    Current providers sharing in care for this patient include:   Patient Care Team:  Mike Kwan MD as PCP - General (Family Practice)      Hearing impairment: " "{NO/YES:581414}    Ability to successfully perform activities of daily living: {YES/NO (MEDICARE):011004::\"Yes, no assistance needed\"}     Fall risk:  {Document Fall Risk in the Assessments Section of the Navigator:061384}    Home safety:  {IPPE SAFETY CONCERNS:573188::\"none identified\"}  {If any of the above assessments are answered yes, consider ordering appropriate referrals:749606::\"click delete button to remove this line now\"}    The following health maintenance items are reviewed in Epic and correct as of today:  Health Maintenance   Topic Date Due     OP ANNUAL INR REFERRAL  1924     BMP Q6 MOS (NO INBASKET)  01/10/2016     FALL RISK ASSESSMENT  2017     INFLUENZA VACCINE (SYSTEM ASSIGNED)  2017     ADVANCE DIRECTIVE PLANNING Q5 YRS (NO INBASKET)  10/10/2019     TETANUS IMMUNIZATION (SYSTEM ASSIGNED)  2025     PNEUMOCOCCAL  Completed         {Decision Support:782575}     ROS:  {ROS COMP:807614}    {CHRONICPROBDATA:387292}  OBJECTIVE:                                                            There were no vitals taken for this visit. Estimated body mass index is 26.91 kg/(m^2) as calculated from the following:    Height as of 17: 5' 2.75\" (1.594 m).    Weight as of 17: 150 lb 12 oz (68.38 kg).  EXAM:   {Exam :450385}    ASSESSMENT / PLAN:                                                            {Diag Picklist:090350}    End of Life Planning:  Patient currently has an advanced directive: { :382028}    COUNSELING:  {Medicare Counselin}    {Blood Pressure - Adult Preventive:694618}    Estimated body mass index is 26.91 kg/(m^2) as calculated from the following:    Height as of 17: 5' 2.75\" (1.594 m).    Weight as of 17: 150 lb 12 oz (68.38 kg).  {Weight Management Plan -- Delete if patient has a normal BMI:726428}   reports that she has never smoked. She has never used smokeless tobacco.  {Tobacco Cessation -- Delete if patient is a " non-smoker:481101}    Appropriate preventive services were discussed with this patient, including applicable screening as appropriate for cardiovascular disease, diabetes, osteopenia/osteoporosis, and glaucoma.  As appropriate for age/gender, discussed screening for colorectal cancer, prostate cancer, breast cancer, and cervical cancer. Checklist reviewing preventive services available has been given to the patient.    Reviewed patients plan of care and provided an AVS. The {CarePlan:434278} for Gisela meets the Care Plan requirement. This Care Plan has been established and reviewed with the {PATIENT, FAMILY MEMBER, CAREGIVER:120458}.    Counseling Resources:  ATP IV Guidelines  Pooled Cohorts Equation Calculator  Breast Cancer Risk Calculator  FRAX Risk Assessment  ICSI Preventive Guidelines  Dietary Guidelines for Americans, 2010  [employer]'s MyPlate  ASA Prophylaxis  Lung CA Screening    Mike Kwan MD, MD  [facility]  Answers for HPI/ROS submitted by the patient on 2/1/2017   PHQ-2 Depressed: Several days, Several days  PHQ-2 Score: 2

## 2017-02-21 NOTE — TELEPHONE ENCOUNTER
Faxed orders.  [place] # for [facility] is [phone]    Told Horacio that their fax sheet needs a phone # and please have pt make an appt a few days before admission for admit paperwork.      Called son to ask about location for kenalog cream. Bilat ankles prn.  Refaxed signed med list.      Left admit papers in review file for [facility]. Please update with any changes when in clinic.  LILA Smith

## 2017-02-21 NOTE — TELEPHONE ENCOUNTER
JW asked triage to copy last ov and med list and fax with paperwork from [employer]. Move in orders from [employer] [phone].

## 2017-02-22 NOTE — TELEPHONE ENCOUNTER
Gave the message to pat.  She took a vo and will fax the order to clinic for Dr. Kwan to sign.  LILA Smith

## 2017-02-22 NOTE — TELEPHONE ENCOUNTER
Lili from [facility] called with additional questions after receiving the patients medication list.  What is the dose for the Vitamin D and needing clarification of the DME order.  Please follow up with Lili at .

## 2017-02-22 NOTE — TELEPHONE ENCOUNTER
Vitamin D 1000 IU per day.  DME order - it is a medication dispenser. She may not need that now if she moved to assisted living facility and if they are managing her meds.

## 2017-03-24 PROBLEM — Z71.89 ADVANCED DIRECTIVES, COUNSELING/DISCUSSION: Chronic | Status: ACTIVE | Noted: 2017-01-01

## 2017-03-30 NOTE — TELEPHONE ENCOUNTER
diltiazem (CARDIZEM SR) 60 MG 12 hr SR capsule         Last Written Prescription Date: 6/1/16  Last Fill Quantity: 180, # refills: 2    Last Office Visit with G, P or [facility] prescribing provider:  2/3/17   Future Office Visit:      BP Readings from Last 3 Encounters:   02/03/17 118/80   01/04/17 118/80   09/23/16 118/66     Lab Results   Component Value Date    ALT 20 10/06/2014     Lab Results   Component Value Date    CHOL 290 10/07/2014     Lab Results   Component Value Date    HDL 78 10/07/2014     Lab Results   Component Value Date     10/07/2014     Lab Results   Component Value Date    TRIG 151 10/07/2014     Lab Results   Component Value Date    CHOLHDLRATIO 3.7 10/07/2014

## 2017-04-03 NOTE — TELEPHONE ENCOUNTER
Routing refill request to provider for review/approval because:  Labs not current:  ALT      ALT ordered.    [employer] message sent to patient asking her to schedule lab only appt.    Routing to PCP/covering providers.    Please sign if agree.    Thank you!  DEEPA Harding, SHADEN, RN

## 2017-04-04 NOTE — TELEPHONE ENCOUNTER
Medication Detail      Disp Refills Start End CHARLI   atorvastatin (LIPITOR) 40 MG tablet 90 tablet 3 2016  No   Sig: Take 1 tablet (40 mg) by mouth daily       Last Office Visit with [facility] or [facility] prescribing provider: 2/3/17       Lab Results   Component Value Date    CHOL 290 10/07/2014     Lab Results   Component Value Date    HDL 78 10/07/2014     Lab Results   Component Value Date     10/07/2014     Lab Results   Component Value Date    TRIG 151 10/07/2014     Lab Results   Component Value Date    CHOLHDLRATIO 3.7 10/07/2014       Medication Detail      Disp Refills Start End CHARLI   ASPIRIN 81 MG OR TABS 100 3 2004  --   Si tab po QD (Once per day)       Last Office Visit with [facility], [facility] or [facility] prescribing provider: 2/3/17  Future Office visit:       Routing refill request to provider for review/approval because:  Drug not on the [facility], [facility] or [facility] refill protocol or controlled substance

## 2017-04-17 NOTE — TELEPHONE ENCOUNTER
Medication Detail      Disp Refills Start End CHARLI   furosemide (LASIX) 20 MG tablet 45 tablet 1 7/19/2016  No   Sig: Take 0.5 tablets (10 mg) by mouth every morning       Last Office Visit with [facility], [facility] or [facility] prescribing provider: 2/3/17       Potassium   Date Value Ref Range Status   07/10/2015 4.0 3.4 - 5.3 mmol/L Final     Creatinine   Date Value Ref Range Status   07/10/2015 1.05 (H) 0.52 - 1.04 mg/dL Final     BP Readings from Last 3 Encounters:   02/03/17 118/80   01/04/17 118/80   09/23/16 118/66

## 2017-04-19 NOTE — TELEPHONE ENCOUNTER
Routing refill request to provider for review/approval because:  Diagnosis not on the [facility] refill protocol Leg swelling [M79.89]  - Primary   Labs not current/out of range:  Creatinine  BMP pended      Thank you  Jony Sifuentes RN

## 2017-04-23 NOTE — TELEPHONE ENCOUNTER
Contacted on call over the weekend by Lili from [facility].  4/22/17-Lung exam with wheezing and oxygen sat 90 %, mild cough.  No fever, not ill appearing.  Hasn't had any illness this week.  No shortness of breath.  No hx of lung disease.  Family is out of town and not able to take her in to have evaluation.    CXR done.  Reviewed signs and symptoms of when she needs to go to ER.    4/23/17-called by Lili with CXR results and she read me the impression:  Hyperinflation.  No infiltrates, no pleural effusion.  Clinical correlation with asthma or copd.  Today wheezing slightly better, patient stable.  o2 92%.  Tried ordering albuterol neb, insurance will not cover without a neb machine and I am told that can't happen until tomorrow.  Lili states that patient should be able to try albuterol inhaler with assist.  Inhaler sent to [facility]. Cancel nebs.  Again reviewed signs and symptoms of when she should be evaluated sooner than tomorrow.    I've asked that Lili connect to patient's PCP tomorrow to establish a plan for her current symptoms and also to keep family updated.      Valerie Mcallister MD

## 2017-04-23 NOTE — TELEPHONE ENCOUNTER
Call Type: Triage Call    Presenting Problem: Patient's nurse at the assisted living home,  Lili, states that patient recieved an order for nebulizer but the  pharmacy ([facility]) will not fill prescription unless patient also  has an order for a nebulizer machine.  Paged On call Dr Mcallister  from [facility] to call patient's nurse Lili back at  [phone] via smart web page  at 12pm.  Advised patient's  nurse to call back if she does not recieve a call within 20 min.  Patient's nurse verbalized understanding.  Triage Note:  Guideline Title: Medication Questions - Adult  Recommended Disposition: Call Dispensing Pharmacy or Provider  Immediately  Original Inclination: Wanted to speak with a nurse  Override Disposition:  Intended Action: Follow advice given  Physician Contacted: No  Requests refill of prescribed medication that does NOT have a valid refill; lack  of medication may cause clinical risk to patient if not available. ?  YES  Sign(s) or symptom(s) associated with a diagnosed condition or with a new illness  ? NO  Prescription ordered today and not available at pharmacy putting patient at  clinical risk ? NO  Recurrence of a symptom(s) or illness post prescribed medication treatment AND  provider instructed patient to call if symptom(s) returned. ? NO  Unable to obtain prescribed medication related to available resources AND  situation poses immediate clinical risk ? NO  Pharmacy calling to clarify prescription order. ? NO  Physician Instructions:  Care Advice:

## 2017-04-24 NOTE — TELEPHONE ENCOUNTER
Reviewed xray result (scanned).  No infiltrate.  Call facility - continue albuterol as recommended by Dr Mcallister and if not improving, office visit or ER visit.

## 2017-04-24 NOTE — TELEPHONE ENCOUNTER
Writer called patient's facility, which is now called [facility], spoke with nurse Mcclure and relayed message per below from Dr. Kwan.    Kami verbalized understanding, in agreement with plan and stated:  1. Patient still has wheezing and cough, but is comfortable and not in distress.  Will continue to monitor and contact PCP clinic with any changes.    SHADE BarajasN, RN

## 2017-05-01 NOTE — PROGRESS NOTES
SUBJECTIVE:                                                    Gisela Abad is a 89 year old female who presents to clinic today for the following health issues:      Acute Illness   Acute illness concerns: cold sx   Onset: 2 weeks    Fever: no    Chills/Sweats: no    Headache (location?): no    Sinus Pressure:no    Conjunctivitis:  no    Ear Pain: no    Rhinorrhea: no    Congestion: no    Sore Throat: no     Cough: YES-non-productive staying same.    Wheeze: no    Decreased Appetite: no    Nausea: no    Vomiting: no    Diarrhea:  no    Dysuria/Freq.: no    Fatigue/Achiness: no    Sick/Strep Exposure: no     Therapies Tried and outcome: cough drop     Still some cough. Had xray last month - negative.   No chest pain.   Using lasix.    Coumadin stopped.     Problem list and histories reviewed & adjusted, as indicated.  Additional history: as documented    Labs reviewed in EPIC    Reviewed and updated as needed this visit by clinical staff    Reviewed and updated as needed this visit by Provider      Social History     Social History     Marital status:      Spouse name: N/A     Number of children: N/A     Years of education: N/A     Social History Main Topics     Smoking status: Never Smoker     Smokeless tobacco: Never Used     Alcohol use Yes      Comment: rare     Drug use: No     Sexual activity: Yes     Partners: Male     Birth control/ protection: Abstinence     Other Topics Concern     Parent/Sibling W/ Cabg, Mi Or Angioplasty Before 65f 55m? No     Social History Narrative     Allergies   Allergen Reactions     Amoxicillin      Sulfa Drugs      Patient Active Problem List   Diagnosis     Mixed hyperlipidemia     Essential hypertension, benign     Esophageal reflux     Atrial fibrillation with rapid ventricular response (H)     Elevated troponin     Atrial fibrillation (H)     Osteoporosis     Mixed hearing loss     Sensory hearing loss, bilateral     Long-term (current) use of anticoagulants  "[Z79.01]     Memory loss     Advance Care Planning     Reviewed medications, social history and  past medical and surgical history.    Review of system: for general, respiratory, CVS, GI and psychiatry negative except for noted above.     EXAM:  /84 (Cuff Size: Adult Regular)  Pulse 82  Temp 97.6  F (36.4  C) (Tympanic)  Ht 5' 2.75\" (1.594 m)  Wt 147 lb (66.7 kg)  SpO2 97%  BMI 26.25 kg/m2  Constitutional: , alert and no distress   Psychiatric: mildly confused but pleasant.   Cardiovascular: slightly irregular rhythm.   Respiratory: negative,mild decreased air entry both lung bases, no crackles or wheezing. .      From 4/23/17 outside CXR results impressoin: Hyperinflation. No infiltrates, no pleural effusion. Clinical correlation with asthma or copd.    ASSESSMENT / PLAN:  (J20.9) Acute bronchitis, unspecified organism  (primary encounter diagnosis)  Comment: reviewed report of outside xray. No infiltrate. Will treat with antibiotics due to duration of symptoms. We stopped coumadin and PE can not be ruled out due to her afib. Son and patient understood. If antibiotics fails - should have discussion if she wishes to have CT scan or not. Using albuterol as needed.   Plan: azithromycin (ZITHROMAX) 250 MG tablet     "

## 2017-05-01 NOTE — NURSING NOTE
"Chief Complaint   Patient presents with     URI       Initial /84 (Cuff Size: Adult Regular)  Pulse 82  Temp 97.6  F (36.4  C) (Tympanic)  Ht 5' 2.75\" (1.594 m)  Wt 147 lb (66.7 kg)  SpO2 97%  BMI 26.25 kg/m2 Estimated body mass index is 26.25 kg/(m^2) as calculated from the following:    Height as of this encounter: 5' 2.75\" (1.594 m).    Weight as of this encounter: 147 lb (66.7 kg).  Medication Reconciliation: complete     Fernanda Olivares, HUE      "

## 2017-05-01 NOTE — MR AVS SNAPSHOT
"              After Visit Summary   5/1/2017    Gisela Abad    MRN: [id number]           Patient Information     Date Of Birth          12/12/1924        Visit Information        Provider Department      5/1/2017 2:20 PM Mike Kwan MD [facility]        Today's Diagnoses     Acute bronchitis, unspecified organism    -  1    Atrial fibrillation with rapid ventricular response (H)           Follow-ups after your visit        Who to contact     If you have questions or need follow up information about today's clinic visit or your schedule please contact [facility] directly at [phone].  Normal or non-critical lab and imaging results will be communicated to you by MyChart, letter or phone within 4 business days after the clinic has received the results. If you do not hear from us within 7 days, please contact the clinic through Matchbookhart or phone. If you have a critical or abnormal lab result, we will notify you by phone as soon as possible.  Submit refill requests through BringMeTheNews or call your pharmacy and they will forward the refill request to us. Please allow 3 business days for your refill to be completed.          Additional Information About Your Visit        MyChart Information     BringMeTheNews gives you secure access to your electronic health record. If you see a primary care provider, you can also send messages to your care team and make appointments. If you have questions, please call your primary care clinic.  If you do not have a primary care provider, please call [phone] and they will assist you.        Care EveryWhere ID     This is your Care EveryWhere ID. This could be used by other organizations to access your [place] medical records  [id number]        Your Vitals Were     Pulse Temperature Height Pulse Oximetry BMI (Body Mass Index)       82 97.6  F (36.4  C) (Tympanic) 5' 2.75\" (1.594 m) 97% 26.25 kg/m2        Blood Pressure from Last 3 " Encounters:   05/01/17 124/84   02/03/17 118/80   01/04/17 118/80    Weight from Last 3 Encounters:   05/01/17 147 lb (66.7 kg)   02/03/17 152 lb 12 oz (69.3 kg)   01/04/17 150 lb 12 oz (68.4 kg)              Today, you had the following     No orders found for display         Today's Medication Changes          These changes are accurate as of: 5/1/17  2:40 PM.  If you have any questions, ask your nurse or doctor.               Start taking these medicines.        Dose/Directions    azithromycin 250 MG tablet   Commonly known as:  ZITHROMAX   Used for:  Acute bronchitis, unspecified organism   Started by:  Mike Kwan MD        Two tablets first day, then one tablet daily for four days.   Quantity:  6 tablet   Refills:  0            Where to get your medicines      These medications were sent to [facility] [address] [address]     Phone:  [phone]     azithromycin 250 MG tablet                Primary Care Provider Office Phone # Fax #    Mike Kwan [phone]       [address] [address]        Thank you!     Thank you for choosing [facility]  for your care. Our goal is always to provide you with excellent care. Hearing back from our patients is one way we can continue to improve our services. Please take a few minutes to complete the written survey that you may receive in the mail after your visit with us. Thank you!             Your Updated Medication List - Protect others around you: Learn how to safely use, store and throw away your medicines at www.disposemymeds.org.          This list is accurate as of: 5/1/17  2:40 PM.  Always use your most recent med list.                   Brand Name Dispense Instructions for use    albuterol 108 (90 BASE) MCG/ACT Inhaler    PROAIR HFA/PROVENTIL HFA/VENTOLIN HFA    1 Inhaler    Inhale 2 puffs into  the lungs every 6 hours as needed for shortness of breath / dyspnea or wheezing       aspirin 81 MG tablet     100 tablet    Take 1 tablet (81 mg) by mouth daily       atorvastatin 40 MG tablet    LIPITOR    90 tablet    Take 1 tablet (40 mg) by mouth daily       azithromycin 250 MG tablet    ZITHROMAX    6 tablet    Two tablets first day, then one tablet daily for four days.       CENTRUM SILVER per tablet      Take 1 tablet by mouth daily       diltiazem 60 MG 12 hr capsule    CARDIZEM SR    180 capsule    Take 1 capsule (60 mg) by mouth 2 times daily       furosemide 20 MG tablet    LASIX    45 tablet    Take 0.5 tablets (10 mg) by mouth every morning PLEASE SCHEDULE AN APPOINTMENT TO RECEIVE FUTURE REFILLS [phone]       metoprolol 50 MG tablet    LOPRESSOR    360 tablet    Take 2 tablets (100 mg) by mouth 2 times daily       order for DME     1 Device    Equipment being ordered: MD2 Medication Dispenser       triamcinolone 0.1 % cream    KENALOG    30 g    Apply sparingly to affected area three times daily for 14 days.       VITAMIN D PO

## 2017-05-04 NOTE — TELEPHONE ENCOUNTER
Routing refill request to provider for review/approval because:  --Labs not current and out of range: Creatinine    --  --Please call patient  and ask him to schedule a lab only for monitoring this med.  A refill request for below medication  was sent to the provider.     Thank you,  Kari Roman RN     Last office visit : 5/1/17 [facility].     Potassium   Date Value Ref Range Status   07/10/2015 4.0 3.4 - 5.3 mmol/L Final   ]  Creatinine   Date Value Ref Range Status   07/10/2015 1.05 (H) 0.52 - 1.04 mg/dL Final   ]  BP Readings from Last 3 Encounters:   05/01/17 124/84   02/03/17 118/80   01/04/17 118/80

## 2017-08-15 NOTE — IP AVS SNAPSHOT
` [place] [address] ONCOLOGY: [phone]                                              INTERAGENCY TRANSFER FORM - NURSING   8/15/2017                    Hospital Admission Date: 8/15/2017  KIMI SEWELL   : 1924  Sex: Female        Attending Provider: Agustina Roth MD     Allergies:  Amoxicillin, Sulfa Drugs    Infection:  None   Service:  HOSPITALIST    Ht:  --   Wt:  71.2 kg (157 lb)   Admission Wt:  71.2 kg (157 lb)    BMI:  --   BSA:  --            Patient PCP Information     Provider PCP Type    Mike Maria Teresa Kwan MD, MD General      Current Code Status     Date Active Code Status Order ID Comments User Context       Prior      Code Status History     Date Active Date Inactive Code Status Order ID Comments User Context    2017  3:20 PM  DNR/DNI [id number]  Maria Isabel Chun MD Outpatient    2017  2:00 AM 2017  3:20 PM DNR/DNI [id number]  Agustina Roth MD Inpatient    2017  2:00 AM 2017  2:00 AM DNR/DNI [id number] Code status discussion is appropriately documented in the chart. Agustina Roth MD Inpatient    10/8/2014  1:53 PM 2017  2:00 AM DNR/DNI [id number]  Kassi Morales MD Outpatient    10/6/2014 11:11 PM 10/8/2014  1:53 PM DNR/DNI [id number]  Alissa Thao MD Inpatient      Advance Directives        Does patient have a scanned Advance Directive/ACP document in EPIC?           No        Hospital Problems as of 2017              Priority Class Noted POA    Stroke (H) Medium  2017 Yes      Non-Hospital Problems as of 2017              Priority Class Noted    Mixed hyperlipidemia Medium  2004    Essential hypertension, benign Medium  2007    Esophageal reflux Medium  2007    Atrial fibrillation with rapid ventricular response (H) Medium  10/6/2014    Elevated troponin Medium  10/6/2014    Atrial fibrillation (H) Medium  10/6/2014    Osteoporosis Medium  3/1/2015    Mixed hearing loss  Medium  1/26/2016    Sensory hearing loss, bilateral Medium  3/7/2016    Long-term (current) use of anticoagulants [Z79.01] Medium  3/31/2016    Memory loss Medium  1/4/2017    Advance Care Planning Medium  3/24/2017      Immunizations     Name Date      Influenza (IIV3) 09/22/14     Influenza (IIV3) 11/17/05     Influenza (IIV3) 10/22/04     Influenza (IIV3) 11/10/03     Pneumococcal (PCV 13) 02/23/15     Pneumococcal 23 valent 11/04/96     TD (ADULT, 7+) 07/30/96     TDAP Vaccine (Adacel) 02/03/15          END      ASSESSMENT     Discharge Profile Flowsheet     EXPECTED DISCHARGE     Patient's communication style  spoken language (English or Bilingual) 08/15/17 2240    Expected Discharge Date  08/18/17 08/17/17 1131   FINAL RESOURCES      DISCHARGE NEEDS ASSESSMENT     Resources List  Hospice 08/17/17 1131    Concerns To Be Addressed  discharge planning concerns 08/17/17 1131   Other Resources  Transportation Services 08/17/17 1131    Patient/family verbalizes understanding of discharge plan recommendations?  Yes 08/17/17 1131   Transportation Agency  [facility] 08/17/17 1131    Medical Team notified of plan?  yes 08/17/17 1131   Transportation Contact Info  [phone] 08/17/17 1131    Anticipated Changes Related to Illness  inability to care for self 08/16/17 1041   Transportation Status  Active 08/17/17 1131    Transportation Available  family or friend will provide 10/08/14 1128   SKIN      Current Discharge Risk  terminally ill 08/16/17 1041   Inspection of bony prominences  Full 08/18/17 0115    # of Referrals Placed by [facility]  Hospice 08/16/17 1041   Skin WDL  ex 08/18/17 0115    Equipment Used at Home  cane, straight;grab bar 10/08/14 1128   Skin Color/Characteristics  bruised (ecchymotic);pale 08/17/17 1259    GASTROINTESTINAL (ADULT,PEDIATRIC,OB)     Skin Temperature  warm 08/17/17 1259    GI WDL  -- (deferred, comfort cares) 08/18/17 0115   Skin Integrity  bruise(s) 08/18/17 0115    Last Bowel Movement  10/06/14  "10/08/14 0951   SAFETY      COMMUNICATION ASSESSMENT     Safety WDL  WDL 08/18/17 0115                 Assessment WDL (Within Defined Limits) Definitions           Safety WDL     Effective: 09/28/15    Row Information: <b>WDL Definition:</b> Bed in low position, wheels locked; call light in reach; upper side rails up x 2; ID band on<br> <font color=\"gray\"><i>Item=AS safety wdl>>List=AS safety wdl>>Version=F14</i></font>      Skin WDL     Effective: 09/28/15    Row Information: <b>WDL Definition:</b> Warm; dry; intact; elastic; without discoloration; pressure points without redness<br> <font color=\"gray\"><i>Item=AS skin wdl>>List=AS skin wdl>>Version=F14</i></font>      Vitals     Vital Signs Flowsheet     VITAL SIGNS     Body Position  left, side-lying 08/18/17 0646    Temp  98.2  F (36.8  C) 08/15/17 2322   Head of Bed (HOB)  HOB at 20-30 degrees 08/18/17 0646    Temp src  Oral 08/15/17 2322   Positioning/Transfer Devices  pillows;in use 08/18/17 0646    Resp  16 08/18/17 0646   DAILY CARE      Heart Rate  80 08/16/17 0103   Activity Type  bedrest;activity adjusted per tolerance 08/18/17 0115    BP  145/72 08/16/17 0103   Activity Level of Assistance  assistance, 2 people 08/18/17 0115    OXYGEN THERAPY     EKG MONITORING      SpO2  97 % 08/16/17 0103   Cardiac Regularity  Irregular 08/15/17 2245    O2 Device  None (Room air) 08/15/17 2240   Cardiac Rhythm  Atrial fibrillation 08/15/17 2245    PAIN/COMFORT     SALTY COMA SCALE      Patient Currently in Pain  sleeping: patient not able to self report 08/18/17 0112   Best Eye Response  4-->(E4) spontaneous 08/17/17 1259    HEIGHT AND WEIGHT     Best Motor Response  6-->(M6) obeys commands 08/17/17 1259    Weight  71.2 kg (157 lb) 08/15/17 2240   Best Verbal Response  5-->(V5) oriented 08/17/17 1259    POSITIONING     Salty Coma Scale Score  15 08/17/17 1259            Patient Lines/Drains/Airways Status    Active LINES/DRAINS/AIRWAYS     Name: Placement date: " Placement time: Site: Days: Last dressing change:    Peripheral IV 08/15/17 Left Lower forearm 08/15/17   2349   Lower forearm   2             Patient Lines/Drains/Airways Status    Active PICC/CVC     None            Intake/Output Detail Report     None      Last Void/BM       Most Recent Value    Urine Occurrence 1 at 08/18/2017 0400    Stool Occurrence       Case Management/Discharge Planning     Case Management/Discharge Planning Flowsheet     REFERRAL INFORMATION     Understanding Of Condition And Treatment  adequate understanding of medical condition 08/16/17 1041    Admission Type  inpatient 08/16/17 1039   EXPECTED DISCHARGE      Arrived From  home or self-care 08/16/17 1039   Expected Discharge Date  08/18/17 08/17/17 1131    # of Referrals Placed by [facility]  Hospice 08/16/17 1041   ASSESSMENT/CONCERNS TO BE ADDRESSED      Reason For Consult  end of life/hospice 08/16/17 1039   Concerns To Be Addressed  discharge planning concerns 08/17/17 1131    Record Reviewed  patient profile 08/16/17 1039   DISCHARGE PLANNING       Assigned to Case  MOISES Harris 08/17/17 1131   Patient/family verbalizes understanding of discharge plan recommendations?  Yes 08/17/17 1131    LIVING ENVIRONMENT     Medical Team notified of plan?  yes 08/17/17 1131    Lives With  facility resident 08/16/17 1039   Anticipated Changes Related to Illness  inability to care for self 08/16/17 1041    Living Arrangements  assisted living 08/16/17 1039   Transportation Available  family or friend will provide 10/08/14 1128    Provides Primary Care For  no one 08/16/17 1039   Current Discharge Risk  terminally ill 08/16/17 1041    Quality Of Family Relationships  supportive;involved 08/16/17 1039   Equipment Used at Home  cane, straight;grab bar 10/08/14 1128    Able to Return to Prior Living Arrangements  yes 08/16/17 1039   PATIENT PLACEMENT INFORMATION      HOME SAFETY     Did the patient choose [place]?  No 08/17/17 1131     Patient Feels Safe Living in Home?  yes 08/16/17 1039   Placement Choice Reason  location 08/17/17 1131    ASSESSMENT OF FAMILY/SOCIAL SUPPORT     FINAL NOTE      Marital Status   08/16/17 1041   Final Note  Patient is to d/c [facility] via stretcehr at 1030 on 8/18 08/17/17 1131    Who is your support system?  Children 08/16/17 1041   FINAL RESOURCES      Description of Support System  Supportive;Involved 08/16/17 1041   Resources List  Hospice 08/17/17 1131    Support Assessment  Adequate family and caregiver support 08/16/17 1041   Other Resources  Transportation Services 08/17/17 1131    Quality of Family Relationships  supportive;involved 08/16/17 1041   Transportation Agency  [facility] 08/17/17 1131    Communication preferences  phone 08/16/17 1041   Transportation Contact Info  [phone] 08/17/17 1131    COPING/STRESS     Transportation Status  Active 08/17/17 1131    Major Change/Loss/Stressor  hospitalization 08/16/17 1041   ABUSE RISK SCREEN      Patient Personal Strengths  expressive of needs;able to adapt;strong support system 08/16/17 1041   QUESTION TO PATIENT:  Has a member of your family or a partner(now or in the past) intimidated, hurt, manipulated, or controlled you in any way?  patient declined to answer or is unable to answer 08/15/17 2242    Sources Of Support  adult child(eliza) 08/16/17 1041   QUESTION TO PATIENT: Do you feel safe going back to the place where you are living?  patient declined to answer or is unable to answer 08/15/17 2242    Reaction To Health Status  accepting 08/16/17 1041   OBSERVATION: Is there reason to believe there has been maltreatment of a vulnerable adult (ie. Physical/Sexual/Emotional abuse, self neglect, lack of adequate food, shelter, medical care, or financial exploitation)?  no 08/15/17 2242

## 2017-08-15 NOTE — Clinical Note
Admitting Physician: TORY ROJAS [[id number]]   Bed Type: Adult Med/Surg [46]   Special needs: Fall Risk [8]   Bed request comments: Neuro floor- Neurology consulted

## 2017-08-15 NOTE — IP AVS SNAPSHOT
[place] [address] ONCOLOGY: [phone]                                              INTERAGENCY TRANSFER FORM - PHYSICIAN ORDERS   8/15/2017                    Hospital Admission Date: 8/15/2017  KIMI SEWELL   : 1924  Sex: Female        Attending Provider: Agustina Roth MD     Allergies:  Amoxicillin, Sulfa Drugs    Infection:  None   Service:  HOSPITALIST    Ht:  --   Wt:  71.2 kg (157 lb)   Admission Wt:  71.2 kg (157 lb)    BMI:  --   BSA:  --            Patient PCP Information     Provider PCP Type    Mike Kwan MD, MD General      ED Clinical Impression     Diagnosis Description Comment Added By Time Added    Stroke (H) [I63.9] Stroke (H) [I63.9]  Florina 2017 12:23 AM      Hospital Problems as of 2017              Priority Class Noted POA    Stroke (H) Medium  2017 Yes      Non-Hospital Problems as of 2017              Priority Class Noted    Mixed hyperlipidemia Medium  2004    Essential hypertension, benign Medium  2007    Esophageal reflux Medium  2007    Atrial fibrillation with rapid ventricular response (H) Medium  10/6/2014    Elevated troponin Medium  10/6/2014    Atrial fibrillation (H) Medium  10/6/2014    Osteoporosis Medium  3/1/2015    Mixed hearing loss Medium  2016    Sensory hearing loss, bilateral Medium  3/7/2016    Long-term (current) use of anticoagulants [Z79.01] Medium  3/31/2016    Memory loss Medium  2017    Advance Care Planning Medium  3/24/2017      Code Status History     Date Active Date Inactive Code Status Order ID Comments User Context    2017  3:20 PM  DNR/DNI [id number]  Maria Isabel Chun MD Outpatient    2017  2:00 AM 2017  3:20 PM DNR/DNI [id number]  Agustina Roth MD Inpatient    2017  2:00 AM 2017  2:00 AM DNR/DNI [id number] Code status discussion is appropriately documented in the chart. Agustina Roth MD Inpatient    10/8/2014  1:53 PM 2017   2:00 AM DNR/DNI [id number]  Kassi Morales MD Outpatient    10/6/2014 11:11 PM 10/8/2014  1:53 PM DNR/DNI [id number]  Alissa Thao MD Inpatient         Medication Review      UNREVIEWED medications. Ask your doctor about these medications        Dose / Directions Comments    FUROSEMIDE PO        Dose:  20 mg   Take 20 mg by mouth daily   Refills:  0          START taking        Dose / Directions Comments    * acetaminophen 650 MG Suppository   Commonly known as:  TYLENOL   Used for:  End of life care        Dose:  650 mg   Place 1 suppository (650 mg) rectally every 4 hours as needed for fever or mild pain (Do not exceed 4000 mg total acetaminophen per day.) Unwrap prior to insertion.   Quantity:  12 suppository   Refills:  1        * acetaminophen 325 MG tablet   Commonly known as:  TYLENOL   Used for:  End of life care        Dose:  325-650 mg   Take 1-2 tablets (325-650 mg) by mouth every 4 hours as needed for mild pain or fever   Quantity:  100 tablet   Refills:  1        atropine 1 % ophthalmic solution   Used for:  End of life care        Dose:  2-4 drop   Take 2-4 drops by mouth, place under tongue or place inside cheek every 2 hours as needed for other (terminal respiratory secretions) Not for ophthalmic use.   Quantity:  5 mL   Refills:  1        haloperidol 2 MG/ML (HIGH CONC) solution   Commonly known as:  HALDOL   Used for:  End of life care        Dose:  0.5-1 mg   Take 0.25-0.5 mLs (0.5-1 mg) by mouth, place under tongue or insert rectally every 6 hours as needed for agitation (nausea)   Quantity:  30 mL   Refills:  1        HYDROmorphone 10 mg/mL Liqd (HIGH CONC) solution   Commonly known as:  DILAUDID high concentration   Used for:  End of life care        Dose:  1-2 mg   Take 0.1-0.2 mLs (1-2 mg) by mouth or place under tongue every 2 hours as needed for moderate to severe pain (and/or??shortness of breath)   Quantity:  30 mL   Refills:  0        LORazepam 2 MG/ML (HIGH CONC) solution    Commonly known as:  ATIVAN   Used for:  End of life care        Dose:  0.25-0.5 mg   Take 0.13-0.25 mLs (0.26-0.5 mg) by mouth, place under tongue or insert rectally every 4 hours as needed for anxiety (restlessness)   Quantity:  30 mL   Refills:  1        MEDICATION INSTRUCTION   Used for:  End of life care        If care facility cannot accept or use ranges, facility is instructed to use lower end of dosing range   Refills:  0        * Notice:  This list has 2 medication(s) that are the same as other medications prescribed for you. Read the directions carefully, and ask your doctor or other care provider to review them with you.      CONTINUE these medications which have NOT CHANGED        Dose / Directions Comments    albuterol 108 (90 BASE) MCG/ACT Inhaler   Commonly known as:  PROAIR HFA/PROVENTIL HFA/VENTOLIN HFA   Used for:  Acute bronchospasm        Dose:  2 puff   Inhale 2 puffs into the lungs every 6 hours as needed for shortness of breath / dyspnea or wheezing   Quantity:  1 Inhaler   Refills:  0        order for DME   Used for:  Memory loss        Equipment being ordered: MD2 Medication Dispenser   Quantity:  1 Device   Refills:  0        triamcinolone 0.1 % cream   Commonly known as:  KENALOG        Apply topically 3 times daily as needed for irritation (legs)   Refills:  0          STOP taking     aspirin 81 MG tablet           atorvastatin 40 MG tablet   Commonly known as:  LIPITOR           CENTRUM SILVER per tablet           diltiazem 60 MG 12 hr capsule   Commonly known as:  CARDIZEM SR           METOPROLOL TARTRATE PO                   After Care     Activity - Up with nursing assistance           Advance Diet as Tolerated       Follow this diet upon discharge: Orders Placed This Encounter      Advance Diet as Tolerated: Clear Liquid Diet       General info for SNF       Length of Stay Estimate: Short Term Care: Estimated # of Days <30  Condition at Discharge: Declining  Level of care:skilled    Rehabilitation Potential: N/A  Admission H&P remains valid and up-to-date: Yes  Recent Chemotherapy: N/A  Use Nursing Home Standing Orders: Yes       Mantoux instructions       Give two-step Mantoux (PPD) Per Facility Policy Yes             Follow-Up Appointment Instructions     Future Labs/Procedures    Follow Up and recommended labs and tests     Comments:    Per hospice facility      Follow-Up Appointment Instructions     Follow Up and recommended labs and tests       Per hospice facility             Statement of Approval     Ordered          08/17/17 1523  I have reviewed and agree with all the recommendations and orders detailed in this document.  EFFECTIVE NOW     Approved and electronically signed by:  Maria Isabel Chun MD

## 2017-08-15 NOTE — IP AVS SNAPSHOT
` ` Patient Information     Patient Name Sex     Gisela Abad ([id number]) Female 1924       Room Bed    31 8831-02      Patient Demographics     Address Phone E-mail Address    [address]   [place] [id number] [phone] (Home)  [phone] (Mobile) [email or web address]      Patient Ethnicity & Race     Ethnic Group Patient Race    American White      Emergency Contact(s)     Name Relation Home Work Mobile    FACUNDO ABAD Son [phone]    Noni Abad Relative [phone]    Scott Abad Son [phone]      Kierra Sánchez Daughter [phone]        Documents on File        Status Date Received Description       Documents for the Patient    Insurance Card  () 02     Face Sheet  () 07     Privacy Notice - [place] Received 11     Insurance Card  ()      Insurance Card  () 10/22/04     Insurance Card  () 07     External Medication Information Consent Accepted 10/06/14     Patient ID Received () 10/06/14     Consent for Services - Hospital/Clinic Received () 11     Consent for Services - Hospital/Clinic Received () 11     External Medication Information Consent Accepted () 11     Consent for EHR Access  13 Copied from existing Consent for services - C/HOD collected on 2011    [facility] Specified Other       Insurance Card Received 10/06/14 MEDICARE    Consent for Services - Hospital/Clinic Received () 10/06/14     Insurance Card Received 14 COMMERCIAL    Consent for Services - Hospital/Clinic Received () 10/21/15     Consent for Services/Privacy Notice - Hospital/Clinic Received () 16     Consent for Services - [facility]       Consent for Services/Privacy Notice - Hospital/Clinic-[facility]       Patient ID   Not Available    Insurance Card Received 16 marsh    Insurance Card  Received 09/23/16 medicare    Power of  Received 01/10/17 Statutory Short Form Power of  10/27/2016    Advance Directives and Living Will Not Received  Validation of AD 10/27/2016    Advance Directives and Living Will Received 01/10/17 Health Care Directive 10/27/2016    Advance Directives and Living Will Received 02/07/17 POLST 02/03/2017    Consent for Services/Privacy Notice - Hospital/Clinic Received 05/01/17        Documents for the Encounter    CMS IM for Patient Signature Received 08/17/17 1MM      Admission Information     Attending Provider Admitting Provider Admission Type Admission Date/Time    Agustina Roth MD Kharel, Tirtha R, MD Emergency 08/15/17  2238    Discharge Date Hospital Service Auth/Cert Status Service Area     Hospitalist [facility] SERVICES    Unit Room/Bed Admission Status        88 ONCOLOGY 8831/8831-02 Admission (Confirmed)       Admission     Complaint    Stroke (H)      Hospital Account     Name Acct ID Class Status Primary Coverage    Gisela Abad [id number] Inpatient Open MEDICARE - MEDICARE            Guarantor Account (for Hospital Account #[id number])     Name Relation to Pt Service Area Active? Acct Type    Gisela Abad  FCS Yes Personal/Family    Address Phone          [address] [place] [address] [phone](H)              Coverage Information (for Hospital Account #[id number])     1. MEDICARE/MEDICARE     F/O Payor/Plan Precert #    MEDICARE/MEDICARE     Subscriber Subscriber #    Gisela Abad [id number]    Address Phone    ATTN CLAIMS  [address]  [facility] [place] [address] [phone]          2. COMMERCIAL/OTHER     F/O Payor/Plan Precert #    COMMERCIAL/OTHER     Subscriber Subscriber #    Gisela Abad [id number]    Address Phone    [address] [address]  [place] [address] [phone]

## 2017-08-15 NOTE — IP AVS SNAPSHOT
` `     [place] [address] ONCOLOGY: [phone]                 INTERAGENCY TRANSFER FORM - NOTES (H&P, Discharge Summary, Consults, Procedures, Therapies)   8/15/2017                    Hospital Admission Date: 8/15/2017  KIMI SEWELL   : 1924  Sex: Female        Patient PCP Information     Provider PCP Type    Mike Kwan MD, MD General      History & Physicals     No notes of this type exist for this encounter.      Discharge Summaries     No notes of this type exist for this encounter.      Consult Notes     No notes of this type exist for this encounter.         Progress Notes - Physician (Notes from 08/15/17 through 17)      Progress Notes by Vianney Mckeon LSW at 2017 10:54 AM     Author:  Vianney Mckeon LSW Service:  (none) Author Type:      Filed:  2017  4:19 PM Date of Service:  2017 10:54 AM Creation Time:  2017 10:54 AM    Status:  Addendum :  Vianney Mckeon LSW ()         LALI  I: LALI spoke with Sylvia from [facility] who updated  that [facility] has a bed available for . Sylvia is updating son. LALI will call family and arrange transportation. LALI will update Serena from [facility].[SM1.1] LALI confirmed no bed was available at [facility] for today. LALI spoke with family to discuss transportation needs. Patient's family would like a stretcher arranged and is ok if patient's receives bill for transport in the case insurance doesn't cover. Patient would require a stretcher ride due to inability to tolerate seated position for length of transport, weakness, hospice. Transportation is arranged for[SM1.2] 930[SM1.3] on .[SM1.2] No meds are needed at d/c.[SM1.4] PCS faxed and given to [facility].[SM1.5]    P: SW will continue to follow and assist as needed.[SM1.1]    LALI needs to fax orders to [phone][SM1.6]    MOISES Russell   *[phone][SM1.1]       Revision History        User Key Date/Time User  Provider Type Action    > SM1.6 8/17/2017  4:19 PM Marik-Alba, Vianney, LSW  Addend     [N/A] 8/17/2017 11:51 AM Marik-Alba, Vianney, LSW  Addend     SM1.5 8/17/2017 11:51 AM Marik-Alba, Vianney, LSW  Addend     SM1.4 8/17/2017 11:50 AM Marik-Alba, Vianney, LSW       SM1.3 8/17/2017 11:45 AM Marik-Alba, Vianney, LSW  Addend     SM1.2 8/17/2017 11:28 AM Marik-Alba, Vianney, LSW  Addend     SM1.1 8/17/2017 11:03 AM Marik-Alba, Vianney, LSW  Sign            Progress Notes by Maria Isabel Chun MD at 8/17/2017  3:26 PM     Author:  Maria Isabel Chun MD Service:  Hospitalist Author Type:  Physician    Filed:  8/17/2017  3:30 PM Date of Service:  8/17/2017  3:26 PM Creation Time:  8/17/2017  3:26 PM    Status:  Signed :  Maria Isabel Chun MD (Physician)         Patient examined, discussed with care staff.    S: Denies pain currently, Denies SOB, n/v, f/c.  O: Vital signs reviewed.  Oriented to self, hospital.    Abdomen: soft    A/P:  89F admitted after stroke for initiation of comfort cares and hospice assessment.  Currently comfortable and family is agreeable for transition to hospice.  Orders placed for transfer to hospice facility tomorrow.[JH1.1]     Revision History        User Key Date/Time User Provider Type Action    > JH1.1 8/17/2017  3:30 PM Maria Isabel Chun MD Physician Sign            Progress Notes by Roney Miguel MD at 8/16/2017  4:21 PM     Author:  Roney Miguel MD Service:  Hospitalist Author Type:  Physician    Filed:  8/16/2017  4:22 PM Date of Service:  8/16/2017  4:21 PM Creation Time:  8/16/2017  4:21 PM    Status:  Signed :  Roney Miguel MD (Physician)         Brief Hospitalist Update Note    No charge note, patient admitted early this AM by Dr. Roth.  Resting comfortably in bed, social work working on placement, family without concerns.  Continue comfort measures.[PB1.1]      Revision History        User Key Date/Time User Provider Type Action    > PB1.1 8/16/2017  4:22 PM Roney Miguel MD Physician Sign            Progress Notes by Vianney Mckeon LSW at 8/16/2017  1:53 PM     Author:  Vianney Mckeon LSW Service:  (none) Author Type:      Filed:  8/16/2017  2:18 PM Date of Service:  8/16/2017  1:53 PM Creation Time:  8/16/2017  1:53 PM    Status:  Addendum :  Vianney Mckeon LSW ()         LALI  I: LALI emailed Serena from [facility] requesting bed availability. LALI also called [facility] to check bed availability. LALI spoke with Marium who stated they may have a bed available shortly but at this moment nothing is available. Marium confirmed information was sent already on patient and will update when bed is available (anticipating tomorrow). LALI will update patient/family.    P: LALI will continue to follow and assist as needed.[SM1.1]    ADDENDUM  I: Serena emailed LALI and stated they would potentially have a bed for Friday or next week. LALI will fax application and awaits call back after assessment.[SM1.2]    MOISES Russell   *[phone][SM1.1]       Revision History        User Key Date/Time User Provider Type Action    > SM1.2 8/16/2017  2:18 PM Vianney Mckeon LSW  Addend     SM1.1 8/16/2017  1:58 PM Vianney Mckeon LSW  Sign            Progress Notes by Sylvia Escobedo RN at 8/16/2017  1:23 PM     Author:  Gregg, Sylvia, RN Service:  Hospice Author Type:  Registered Nurse    Filed:  8/16/2017  1:31 PM Date of Service:  8/16/2017  1:23 PM Creation Time:  8/16/2017  1:23 PM    Status:  Signed :  Sylvia Escobedo RN (Registered Nurse)          Hospice: Met with patients son Damian and spouse Noni to talk about hospice services. They had a prior hospice consult so are familiar with the program and services. Pt is currently at [facility] and have been happy with the care there but worry that they may not be able  to provide end of life care to their satisfaction. They asked me about [facility] and I stated we would need to see if there was a bed available and the  Vianney would make that call. Their second choice is [facility] who may have a bed. The plan would be if [facility] does not have a bed I would meet again with the son to sign hospice consent forms for [facility] and the pt going to [facility]. I will fax information over to [facility]. Thank you for the referral. Sylvia Escobedo RN, BSN  [facility] Admission nurse  [phone][HR1.1]     Revision History        User Key Date/Time User Provider Type Action    > HR1.1 8/16/2017  1:31 PM Sylvia Escobedo, RN Registered Nurse Sign            Progress Notes by Vianney Mckeon LSW at 8/16/2017 10:41 AM     Author:  Vianney Mckeon LSW Service:  (none) Author Type:      Filed:  8/16/2017 10:47 AM Date of Service:  8/16/2017 10:41 AM Creation Time:  8/16/2017 10:41 AM    Status:  Signed :  Vianney Mckeon LSW ()         Care Transition Initial Assessment - SW  Reason For Consult: end of life/hospice  Met with: FAMILY[SM1.1]   Active Problems:    Stroke (H)[SM1.2]         DATA  Lives With: facility resident  Living Arrangements: assisted living  Description of Support System: Supportive, Involved  Who is your support system?: Children  Support Assessment: Adequate family and caregiver support.   Identified issues/concerns regarding health management: Patient will need hospice at d/c  Quality Of Family Relationships: supportive, involved    ASSESSMENT  Cognitive Status:  Alert and oriented  Concerns to be addressed: Patient is a 89 year old female who was admitted to the hospital for a stroke. Prior to hospitalization patient was living at [facility] where she was managing well. Patient has made it known to family and medical staff that she would like to sign on to hospice. SW called patient's son, Jorge L, to discuss  patient's d/c plan. Patient's son is in agreement with patient's plan and is hoping she can go back to [facility]. SW has a call out to staff to discuss patient's d/c plan. Patient has previously had a hospice consult with  hospice and feels they do not need another one but would like to sign consents today. LALI has a call out to Sylvia from  hospice to see if she has a 1300 available. LALI awaits a call back. delphine Correa will plan to arrive at hospital around 1300.     PLAN  Financial costs for the patient includes  Patient given options and choices for discharge with hospice  Patient/family is agreeable to the plan?  YES  Patient Goals and Preferences:  hospice  Patient anticipates discharging to:  [facility]    MOISES Russell   *[phone][SM1.1]               Revision History        User Key Date/Time User Provider Type Action    > SM1.2 8/16/2017 10:47 AM Vianney Mckeon LSW  Sign     SM1.1 8/16/2017 10:41 AM Vianney Mckeon LSW              ED Notes by Patricia Farmer RN at 8/16/2017 12:55 AM     Author:  Patricia Farmer RN Service:  (none) Author Type:  Nurse    Filed:  8/16/2017  1:00 AM Date of Service:  8/16/2017 12:55 AM Creation Time:  8/16/2017 12:56 AM    Status:  Addendum :  Patricia Farmer RN (Nurse)         Family went home for evening, they are available by phone if needed.[KH1.1]  Damian-- patient's son is POA- can be reached at  ([KH1.2]M[KH1.3]) [phone] or ([KH1.2]H[KH1.3]) [phone][KH1.2]     Revision History        User Key Date/Time User Provider Type Action    > KH1.3 8/16/2017  1:00 AM Patricia Farmer RN Nurse Addend     KH1.2 8/16/2017 12:57 AM Patricia Farmer RN Nurse Addend     KH1.1 8/16/2017 12:56 AM Patricia Farmer RN Nurse Sign            ED Notes by Patricia Farmer RN at 8/16/2017 12:39 AM     Author:  Patricia Farmer RN Service:  (none) Author Type:  Nurse    Filed:  8/16/2017 12:43 AM Date of  Service:  8/16/2017 12:39 AM Creation Time:  8/16/2017 12:43 AM    Status:  Signed :  Patricia Farmer RN (Nurse)         [facility]  ED Nurse Handoff Report    ED Chief complaint: No chief complaint on file.      ED Diagnosis:   Final diagnoses:   Stroke (H)       Code Status: DNR / DNI    Allergies:   Allergies   Allergen Reactions     Amoxicillin      Sulfa Drugs        Activity level - Baseline/Home:  Stand with Assist    Activity Level - Current:   Total Care     Needed?: No    Isolation: No  Infection: Not Applicable    Bariatric?: No    Vital Signs:   Vitals:    08/15/17 2321 08/15/17 2342 08/16/17 0008 08/16/17 0009   BP:    146/85   Resp:  18 25 20   Temp: 98.2  F (36.8  C)      TempSrc: Oral      SpO2:  94% 95% 97%   Weight:           Cardiac Rhythm: ,   Cardiac  Cardiac Rhythm: Atrial fibrillation    Pain level:      Is this patient confused?: No    Patient Report: Initial Complaint: Patient was found on floor by facility staff at 2140,  Last known well time was 2040.  Patient comes from [facility].  Patient has a hx of Afib, was taken off of Coumadin on May 1.  Patient presented with left sided facial droop, left sided faccid arm/leg. Code stroke called.   Focused Assessment: Slurred speech, left sided facial droop, left sided neglect.  Sensation to touch improved from first assessement.  Patient denies pain.   Tests Performed: CT/CTA, Labs  Abnormal Results: See results  Treatments provided: ASA suppository, NS bolus    Family Comments: Family at bedside.     OBS brochure/video discussed/provided to patient: No    ED Medications:   Medications   aspirin Suppository 300 mg (not administered)   sodium chloride 0.9 % for CT scan flush dose 100 mL (100 mLs Intravenous Given 8/15/17 2312)   iopamidol (ISOVUE-370) solution 120 mL (120 mLs Intravenous Given 8/15/17 2313)       Drips infusing?:  No      ED NURSE PHONE NUMBER: [phone][KH1.1]             Revision History        User Key Date/Time User Provider Type Action    > KH1.1 8/16/2017 12:43 AM Patricia Farmer RN Nurse Sign            ED Notes by Patricia Farmer RN at 8/16/2017 12:29 AM     Author:  Patricia Farmer RN Service:  (none) Author Type:  Nurse    Filed:  8/16/2017 12:29 AM Date of Service:  8/16/2017 12:29 AM Creation Time:  8/16/2017 12:29 AM    Status:  Signed :  Patricia Farmer RN (Nurse)         Hospitalist at bedside.[KH1.1]      Revision History        User Key Date/Time User Provider Type Action    > KH1.1 8/16/2017 12:29 AM Patricia Farmer RN Nurse Sign            ED Notes by Patricia Farmer RN at 8/16/2017 12:26 AM     Author:  Patricia Farmer RN Service:  (none) Author Type:  Nurse    Filed:  8/16/2017 12:28 AM Date of Service:  8/16/2017 12:26 AM Creation Time:  8/16/2017 12:28 AM    Status:  Signed :  Patricia Farmer RN (Nurse)         Patient repositioned, patient awake, makes needs known.  Patient moves right side independently with strong  strength, no movement noted to left arm.  Patient moving legs, wiggling down in bed.  Patient denies pain at this time. Family at bedside.[KH1.1]      Revision History        User Key Date/Time User Provider Type Action    > KH1.1 8/16/2017 12:28 AM Patricia Farmer RN Nurse Sign            ED Notes by Patricia Farmer RN at 8/15/2017 11:49 PM     Author:  Patricia Farmer RN Service:  (none) Author Type:  Nurse    Filed:  8/15/2017 11:50 PM Date of Service:  8/15/2017 11:49 PM Creation Time:  8/15/2017 11:50 PM    Status:  Signed :  Patricia Farmer RN (Nurse)         Report received from LILA Borden.  Care assumed at this time.  Family at bedside.  Side rails up for safety, call light within reach.[KH1.1]       Revision History        User Key Date/Time User Provider Type Action    > KH1.1 8/15/2017 11:50 PM Patricia Farmer, RN Nurse Sign            ED Notes by Jorge Travis RN at 8/15/2017 11:27  PM     Author:  Jorge Travis RN Service:  (none) Author Type:  Registered Nurse    Filed:  8/15/2017 11:27 PM Date of Service:  8/15/2017 11:27 PM Creation Time:  8/15/2017 11:27 PM    Status:  Signed :  Jorge Travis RN (Registered Nurse)         Bed: ED04  Expected date:   Expected time:   Means of arrival:   Comments:  HOLD STABE 1     Revision History        User Key Date/Time User Provider Type Action    > JN1.1 8/15/2017 11:27 PM Jorge Travis RN Registered Nurse Sign            ED Notes by Suha Martinez RN at 8/15/2017 11:26 PM     Author:  Suha Martinez RN Service:  (none) Author Type:  Registered Nurse    Filed:  8/15/2017 11:26 PM Date of Service:  8/15/2017 11:26 PM Creation Time:  8/15/2017 11:26 PM    Status:  Signed :  Suha Martinez RN (Registered Nurse)         Critical Care Time (RN) Mins: 54[AH1.1]     Revision History        User Key Date/Time User Provider Type Action    > AH1.1 8/15/2017 11:26 PM Shua Martinez RN Registered Nurse Sign            ED Notes by Suha Martinez RN at 8/15/2017 11:18 PM     Author:  Suha Martinez RN Service:  (none) Author Type:  Registered Nurse    Filed:  8/15/2017 11:24 PM Date of Service:  8/15/2017 11:18 PM Creation Time:  8/15/2017 11:24 PM    Status:  Signed :  Suha Martinez RN (Registered Nurse)         Pt returned from CT[AH1.1]      Revision History        User Key Date/Time User Provider Type Action    > AH1.1 8/15/2017 11:24 PM Suha Martinez RN Registered Nurse Sign            ED Notes by Suha Martinez RN at 8/15/2017 11:22 PM     Author:  Suha Martinez RN Service:  (none) Author Type:  Registered Nurse    Filed:  8/15/2017 11:22 PM Date of Service:  8/15/2017 11:22 PM Creation Time:  8/15/2017 11:22 PM    Status:  Signed :  Suha Martinez, RN (Registered Nurse)         Family at bedside- son and daughter in law[AH1.1]      Revision History        User Key Date/Time User Provider Type Action    > AH1.1 8/15/2017 11:22 PM Suha Martinez,  RN Registered Nurse Sign            ED Notes by Suha Martinez RN at 8/15/2017 11:12 PM     Author:  Suha Martinez RN Service:  (none) Author Type:  Registered Nurse    Filed:  8/15/2017 11:12 PM Date of Service:  8/15/2017 11:12 PM Creation Time:  8/15/2017 11:12 PM    Status:  Signed :  Suha Martinez RN (Registered Nurse)         Report received from Lux SHARP[AH1.1]     Revision History        User Key Date/Time User Provider Type Action    > AH1.1 8/15/2017 11:12 PM Suha Martinez RN Registered Nurse Sign            ED Notes by Lux Kerns RN at 8/15/2017 10:32 PM     Author:  Lux Kerns RN Service:  (none) Author Type:  Registered Nurse    Filed:  8/15/2017 10:56 PM Date of Service:  8/15/2017 10:32 PM Creation Time:  8/15/2017 10:56 PM    Status:  Signed :  Lux Kerns RN (Registered Nurse)         MD at bedside.[LG1.1]     Revision History        User Key Date/Time User Provider Type Action    > LG1.1 8/15/2017 10:56 PM Lux Kerns RN Registered Nurse Sign            ED Notes by Jorge Travis RN at 8/15/2017 10:38 PM     Author:  Jorge Travis RN Service:  (none) Author Type:  Registered Nurse    Filed:  8/15/2017 10:38 PM Date of Service:  8/15/2017 10:38 PM Creation Time:  8/15/2017 10:38 PM    Status:  Signed :  Jorge Travis RN (Registered Nurse)         Bed: ST01  Expected date: 8/15/17  Expected time: 10:25 PM  Means of arrival: Ambulance  Comments:  [facility] 89F stroke symptoms, speech last normal 2040     Revision History        User Key Date/Time User Provider Type Action    > JN1.1 8/15/2017 10:38 PM Jorge Travis RN Registered Nurse Sign                  Procedure Notes     No notes of this type exist for this encounter.      Progress Notes - Therapies (Notes from 08/15/17 through 08/18/17)     No notes of this type exist for this encounter.

## 2017-08-15 NOTE — IP AVS SNAPSHOT
[place] [address] ONCOLOGY: [phone]                                              INTERAGENCY TRANSFER FORM - LAB / IMAGING / EKG / EMG RESULTS   8/15/2017                    Hospital Admission Date: 8/15/2017  KIMI SEWELL   : 1924  Sex: Female        Attending Provider: Agustina Roth MD     Allergies:  Amoxicillin, Sulfa Drugs    Infection:  None   Service:  HOSPITALIST    Ht:  --   Wt:  71.2 kg (157 lb)   Admission Wt:  71.2 kg (157 lb)    BMI:  --   BSA:  --            Patient PCP Information     Provider PCP Type    Mike Kwan MD, MD General         Lab Results - 3 Days      Basic metabolic panel [[id number]] (Abnormal)  Resulted: 08/15/17 2301, Result status: Final result    Ordering provider: Celi Lamar MD  08/15/17 2238 Resulting lab: [facility]    Specimen Information    Type Source Collected On     08/15/17 2238          Components       Value Reference Range Flag Lab   Sodium 136 133 - 144 mmol/L  FrStHsLb   Potassium 3.8 3.4 - 5.3 mmol/L  FrStHsLb   Chloride 101 94 - 109 mmol/L  FrStHsLb   Carbon Dioxide 25 20 - 32 mmol/L  FrStHsLb   Anion Gap 10 3 - 14 mmol/L  FrStHsLb   Glucose 131 70 - 99 mg/dL H FrStHsLb   Urea Nitrogen 20 7 - 30 mg/dL  FrStHsLb   Creatinine 1.12 0.52 - 1.04 mg/dL H FrStHsLb   GFR Estimate 45 >60 mL/min/1.7m2 L FrStHsLb   Comment:  Non  GFR Calc   GFR Estimate If Black 55 >60 mL/min/1.7m2 L FrStHsLb   Comment:  African American GFR Calc   Calcium 8.6 8.5 - 10.1 mg/dL  FrStHsLb            Partial thromboplastin time [[id number]]  Resulted: 08/15/17 2259, Result status: Final result    Ordering provider: Celi Lamar MD  08/15/17 2238 Resulting lab: [facility]    Specimen Information    Type Source Collected On     08/15/17 2238          Components       Value Reference Range Flag Lab   PTT 26 22 - 37 sec  FrStHsLb            INR [[id number]]  Resulted: 08/15/17 2258,  Result status: Final result    Ordering provider: Celi Lamar MD  08/15/17 2238 Resulting lab: [facility]    Specimen Information    Type Source Collected On     08/15/17 2238          Components       Value Reference Range Flag Lab   INR 1.07 0.86 - 1.14  FrStHsLb            CBC with platelets differential [[id number]]  Resulted: 08/15/17 2248, Result status: Final result    Ordering provider: Celi Lamar MD  08/15/17 2238 Resulting lab: [facility]    Specimen Information    Type Source Collected On     08/15/17 2238          Components       Value Reference Range Flag Lab   WBC 10.3 4.0 - 11.0 10e9/L  FrStHsLb   RBC Count 4.33 3.8 - 5.2 10e12/L  FrStHsLb   Hemoglobin 13.9 11.7 - 15.7 g/dL  FrStHsLb   Hematocrit 40.7 35.0 - 47.0 %  FrStHsLb   MCV 94 78 - 100 fl  FrStHsLb   MCH 32.1 26.5 - 33.0 pg  FrStHsLb   MCHC 34.2 31.5 - 36.5 g/dL  FrStHsLb   RDW 13.0 10.0 - 15.0 %  FrStHsLb   Platelet Count 219 150 - 450 10e9/L  FrStHsLb   Diff Method Automated Method   FrStHsLb   % Neutrophils 71.4 %  FrStHsLb   % Lymphocytes 17.0 %  FrStHsLb   % Monocytes 9.9 %  FrStHsLb   % Eosinophils 1.3 %  FrStHsLb   % Basophils 0.2 %  FrStHsLb   % Immature Granulocytes 0.2 %  FrStHsLb   Nucleated RBCs 0 0 /100  FrStHsLb   Absolute Neutrophil 7.4 1.6 - 8.3 10e9/L  FrStHsLb   Absolute Lymphocytes 1.8 0.8 - 5.3 10e9/L  FrStHsLb   Absolute Monocytes 1.0 0.0 - 1.3 10e9/L  FrStHsLb   Absolute Eosinophils 0.1 0.0 - 0.7 10e9/L  FrStHsLb   Absolute Basophils 0.0 0.0 - 0.2 10e9/L  FrStHsLb   Abs Immature Granulocytes 0.0 0 - 0.4 10e9/L  FrStHsLb   Absolute Nucleated RBC 0.0   FrStHsLb            Testing Performed By     Lab - Abbreviation Name Director Address Valid Date Range    14 - FrStHsLb [facility] Unknown [address] [place] [address] 05/08/15 1057 - Present            Unresulted Labs     None         Imaging Results - 3 Days      CT Head w Contrast [[id number]]   Resulted: 08/16/17 0933, Result status: Final result    Ordering provider: Celi Lamar MD  08/15/17 2241 Resulted by: Eric Guzmán MD    Performed: 08/15/17 2248 - 08/15/17 2327 Resulting lab: RADIOLOGY RESULTS    Narrative:       CT ANGIOGRAM OF THE HEAD AND NECK WITHOUT AND WITH CONTRAST  8/15/2017  11:13 PM     HISTORY: Slurred speech and right-sided gaze. Patient fell. Code  stroke. Chronic atrial fibrillation. Pronounced left facial droop.    TECHNIQUE:  Precontrast localizing scans were followed by CT  angiography with an injection of 70 mL Isovue-370 (accession  [id number]), 50 mL Isovue-370 (accession [id number]) IV with scans  through the head and neck.  Images were transferred to a separate 3-D  workstation where multiplanar reformations and 3-D images were  created.  Estimates of carotid stenoses are made relative to the  distal internal carotid artery diameters except as noted. Radiation  dose for this scan was reduced using automated exposure control,  adjustment of the mA and/or kV according to patient size, or iterative  reconstruction technique.  Perfusion scans were performed at three  levels with injection of an additional 40 mL IV nonionic contrast and  20 mL saline flush.  These images were processed on a separate 3-D  workstation.    COMPARISON: CT head today.    CT HEAD FINDINGS:  No contrast enhancing lesions.  Perfusion CT scan  shows markedly decreased blood flow to the lateral aspect of the right  cerebral hemisphere in the right middle cerebral artery territory.  Color map images could not be produced because of motion artifacts.    CT ANGIOGRAM HEAD FINDINGS:  There is occlusion of the right middle  cerebral artery at the end of the M1 segment. There is diminished flow  in the right internal carotid artery distally, probably from lack of  distal runoff. Right anterior cerebral artery A1 segment is threadlike  and the A2 segment is small. Multiple possible intracranial  stenoses  are seen in the left anterior and middle cerebral arteries, the  basilar artery and throughout the posterior cerebral arteries,  although some of these may be motion artifacts. There is extensive  calcified atherosclerotic plaque in the carotid siphons. Thin section  images raise the possibility of a thrombus in the right carotid siphon  versus noncalcified atherosclerotic plaque. Venous circulation is  unremarkable.     CT ANGIOGRAM NECK FINDINGS:   Right carotid artery: Mild calcified atherosclerotic plaque in the  proximal internal carotid artery.  No significant stenosis.      Left carotid artery: Minimal calcified plaque in the distal common  carotid and proximal internal carotid arteries.  No significant  stenosis.      Vertebral arteries:  Calcified plaque right distal vertebral artery V4  segment causing mild stenosis.  No other stenosis.      Other findings: Thyroid goiter, larger on the right than on the left.      Impression:       IMPRESSION:   1. 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  2. Multiple bilateral intracranial arterial stenoses in all  circulations.  3. Extensive plaque in the carotid siphons causing moderate to severe  stenoses, best appreciated on axial thin section images. Cannot rule  out a thrombus in the right carotid siphon.    I agree with the preliminary report that was communicated to the  referring physician.    ERIC OHARA MD      CTA Angiogram Head Neck [[id number]]  Resulted: 08/16/17 0933, Result status: Final result    Ordering provider: Celi Lamar MD  08/15/17 2241 Resulted by: Eric Ohara MD    Performed: 08/15/17 2248 - 08/15/17 2313 Resulting lab: RADIOLOGY RESULTS    Narrative:       CT ANGIOGRAM OF THE HEAD AND NECK WITHOUT AND WITH CONTRAST  8/15/2017  11:13 PM     HISTORY: Slurred speech and right-sided gaze. Patient fell. Code  stroke. Chronic atrial fibrillation. Pronounced left facial  keith.    TECHNIQUE:  Precontrast localizing scans were followed by CT  angiography with an injection of 70 mL Isovue-370 (accession  [id number]), 50 mL Isovue-370 (accession [id number]) IV with scans  through the head and neck.  Images were transferred to a separate 3-D  workstation where multiplanar reformations and 3-D images were  created.  Estimates of carotid stenoses are made relative to the  distal internal carotid artery diameters except as noted. Radiation  dose for this scan was reduced using automated exposure control,  adjustment of the mA and/or kV according to patient size, or iterative  reconstruction technique.  Perfusion scans were performed at three  levels with injection of an additional 40 mL IV nonionic contrast and  20 mL saline flush.  These images were processed on a separate 3-D  workstation.    COMPARISON: CT head today.    CT HEAD FINDINGS:  No contrast enhancing lesions.  Perfusion CT scan  shows markedly decreased blood flow to the lateral aspect of the right  cerebral hemisphere in the right middle cerebral artery territory.  Color map images could not be produced because of motion artifacts.    CT ANGIOGRAM HEAD FINDINGS:  There is occlusion of the right middle  cerebral artery at the end of the M1 segment. There is diminished flow  in the right internal carotid artery distally, probably from lack of  distal runoff. Right anterior cerebral artery A1 segment is threadlike  and the A2 segment is small. Multiple possible intracranial stenoses  are seen in the left anterior and middle cerebral arteries, the  basilar artery and throughout the posterior cerebral arteries,  although some of these may be motion artifacts. There is extensive  calcified atherosclerotic plaque in the carotid siphons. Thin section  images raise the possibility of a thrombus in the right carotid siphon  versus noncalcified atherosclerotic plaque. Venous circulation is  unremarkable.     CT ANGIOGRAM NECK FINDINGS:    Right carotid artery: Mild calcified atherosclerotic plaque in the  proximal internal carotid artery.  No significant stenosis.      Left carotid artery: Minimal calcified plaque in the distal common  carotid and proximal internal carotid arteries.  No significant  stenosis.      Vertebral arteries:  Calcified plaque right distal vertebral artery V4  segment causing mild stenosis.  No other stenosis.      Other findings: Thyroid goiter, larger on the right than on the left.      Impression:       IMPRESSION:   1. 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  2. Multiple bilateral intracranial arterial stenoses in all  circulations.  3. Extensive plaque in the carotid siphons causing moderate to severe  stenoses, best appreciated on axial thin section images. Cannot rule  out a thrombus in the right carotid siphon.    I agree with the preliminary report that was communicated to the  referring physician.    KANG OHARA MD      CT Head w/o Contrast [[id number]]  Resulted: 08/15/17 2256, Result status: Final result    Ordering provider: Celi Lamar MD  08/15/17 2241 Resulted by: Azar Geiger MD    Performed: 08/15/17 2248 - 08/15/17 2254 Resulting lab: RADIOLOGY RESULTS    Narrative:       CT HEAD W/O CONTRAST  8/15/2017 10:54 PM    HISTORY: Slurred speech. Patient fell. Possible stroke.    TECHNIQUE: Scans were obtained through the head without IV contrast.   Radiation dose for this scan was reduced using automated exposure  control, adjustment of the mA and/or kV according to patient size, or  iterative reconstruction technique.    COMPARISON: None.    FINDINGS: Atrophy. Moderate low-density change in the white matter  both hemispheres consistent with chronic small vessel ischemic  disease.  No hemorrhage, mass lesion, or focal area of acute  infarction identified. Paranasal sinuses are normal. No bony  abnormality.      Impression:       IMPRESSION:   1. Atrophy  and chronic white matter disease.  2. Nothing acute.  3. CT angiogram to follow.    DAWSON BENTON MD      Testing Performed By     Lab - Abbreviation Name Director Address Valid Date Range    104 - Rad Rslts RADIOLOGY RESULTS Unknown Unknown 02/16/05 1553 - Present            Encounter-Level Documents:     There are no encounter-level documents.      Order-Level Documents:     There are no order-level documents.

## 2017-08-15 NOTE — IP AVS SNAPSHOT
` [place] [address] ONCOLOGY: [phone]            Medication Administration Report for Gisela Abad as of 08/18/17 0800   Legend:    Given Hold Not Given Due Canceled Entry Other Actions    Time Time (Time) Time  Time-Action       Inactive    Active    Linked        Medications 08/12/17 08/13/17 08/14/17 08/15/17 08/16/17 08/17/17 08/18/17    acetaminophen (TYLENOL) Suppository 650 mg  Dose: 650 mg Freq: EVERY 4 HOURS PRN Route: RE  PRN Reason: mild pain  Start: 08/16/17 0218   Admin Instructions: Alternate ibuprofen (if ordered) with acetaminophen.  Maximum acetaminophen dose from all sources = 75 mg/kg/day not to exceed 4 grams/day.               acetaminophen (TYLENOL) tablet 650 mg  Dose: 650 mg Freq: EVERY 4 HOURS PRN Route: PO  PRN Reason: mild pain  Start: 08/16/17 0218   Admin Instructions: Alternate ibuprofen (if ordered) with acetaminophen.  Maximum acetaminophen dose from all sources = 75 mg/kg/day not to exceed 4 grams/day.               albuterol neb solution 2.5 mg  Dose: 2.5 mg Freq: EVERY 2 HOURS PRN Route: NEBULIZATION  PRN Reasons: wheezing,shortness of breath / dyspnea  Start: 08/16/17 0219              artificial saliva (BIOTENE MT) solution 1 spray  Dose: 1 spray Freq: EVERY 1 HOUR PRN Route: MT  PRN Reason: dry mouth  Start: 08/16/17 0159              atropine 1 % ophthalmic solution 1-2 drop  Dose: 1-2 drop Freq: EVERY 1 HOUR PRN Route: SL  PRN Reason: other  PRN Comment: secretions  Start: 08/16/17 0159              bisacodyl (DULCOLAX) Suppository 10 mg  Dose: 10 mg Freq: DAILY PRN Route: RE  PRN Reason: constipation  Start: 08/16/17 0218   Admin Instructions: Hold for loose stools.  This is the third step of a three step constipation treatment protocol.               HYDROmorphone (PF) (DILAUDID) injection 0.3-0.5 mg  Dose: 0.3-0.5 mg Freq: EVERY 1 HOUR PRN Route: IV  PRN Reason: moderate to severe pain  PRN Comment: or dyspnea  Start: 08/16/17 0159               LORazepam (ATIVAN) injection 0.5-1 mg  Dose: 0.5-1 mg Freq: EVERY 3 HOURS PRN Route: IV  PRN Reasons: anxiety,seizures,nausea  PRN Comment: dyspnea  Start: 08/16/17 0159   Admin Instructions: Avoid if delirium present.  Use fourth line for nausea.  For IV PUSH: Dilute with equal volume of NS.              Or  LORazepam (ATIVAN) tablet 0.5-1 mg  Dose: 0.5-1 mg Freq: EVERY 3 HOURS PRN Route: PO  PRN Reasons: anxiety,seizures,nausea  PRN Comment: dyspnea  Start: 08/16/17 0159   Admin Instructions: Avoid if delirium present.  Use fourth line for nausea.              Or  LORazepam (ATIVAN) tablet 0.5-1 mg  Dose: 0.5-1 mg Freq: EVERY 3 HOURS PRN Route: SL  PRN Reasons: anxiety,seizures,nausea  PRN Comment: dyspnea  Start: 08/16/17 0159   Admin Instructions: Avoid if delirium present.  Use fourth line for nausea.               naloxone (NARCAN) injection 0.1-0.4 mg  Dose: 0.1-0.4 mg Freq: EVERY 2 MIN PRN Route: IV  PRN Reason: opioid reversal  Start: 08/16/17 0201   Admin Instructions: For respiratory rate LESS than or EQUAL to 8.  Partial reversal dose:  0.1 mg titrated q 2 minutes for Analgesia Side Effects Monitoring Sedation Level of 3 (frequently drowsy, arousable, drifts to sleep during conversation).Full reversal dose:  0.4 mg bolus for Analgesia Side Effects Monitoring Sedation Level of 4 (somnolent, minimal or no response to stimulation).               ondansetron (ZOFRAN-ODT) ODT tab 4 mg  Dose: 4 mg Freq: EVERY 6 HOURS PRN Route: PO  PRN Reasons: nausea,vomiting  Start: 08/16/17 0218   Admin Instructions: This is Step 1 of nausea and vomiting management.  If nausea not resolved in 15 minutes, go to Step 2 prochlorperazine (COMPAZINE). Do not push through foil backing. Peel back foil and gently remove. Place on tongue immediately. Administration with liquid unnecessary              Or  ondansetron (ZOFRAN) injection 4 mg  Dose: 4 mg Freq: EVERY 6 HOURS PRN Route: IV  PRN Reasons: nausea,vomiting  Start: 08/16/17  0218   Admin Instructions: This is Step 1 of nausea and vomiting management.  If nausea not resolved in 15 minutes, go to Step 2 prochlorperazine (COMPAZINE).  Irritant.               oxyCODONE (ROXICODONE) IR half-tab 2.5-5 mg  Dose: 2.5-5 mg Freq: EVERY 2 HOURS PRN Route: PO  PRN Reason: moderate to severe pain  PRN Comment: or dyspnea  Start: 08/16/17 0159                    Or  oxyCODONE (ROXICODONE INTENSOL) 100 MG/5ML (HIGH CONC) solution 5-7.6 mg  Dose: 5-7.6 mg Freq: EVERY 2 HOURS PRN Route: SL  PRN Reason: moderate to severe pain  PRN Comment: or dyspnea  Start: 08/16/17 0159   Admin Instructions: CAUTION: solution is 20 times more concentrated than standard solution. Verify dose volume.         2238 (5 mg)-Given             prochlorperazine (COMPAZINE) injection 5 mg  Dose: 5 mg Freq: EVERY 6 HOURS PRN Route: IV  PRN Reasons: nausea,vomiting  Start: 08/16/17 0159   Admin Instructions: This is Step 2 of nausea and vomiting management.   If nausea not resolved in 15 minutes, give metoclopramide (REGLAN) if ordered (step 3 of nausea and vomiting management)              Or  prochlorperazine (COMPAZINE) tablet 5 mg  Dose: 5 mg Freq: EVERY 6 HOURS PRN Route: PO  PRN Reason: vomiting  Start: 08/16/17 0159   Admin Instructions: This is Step 2 of nausea and vomiting management.   If nausea not resolved in 15 minutes, give metoclopramide (REGLAN) if ordered (step 3 of nausea and vomiting management)              Or  prochlorperazine (COMPAZINE) Suppository 12.5 mg  Dose: 12.5 mg Freq: EVERY 12 HOURS PRN Route: RE  PRN Reasons: nausea,vomiting  Start: 08/16/17 0159   Admin Instructions: This is Step 2 of nausea and vomiting management.   If nausea not resolved in 15 minutes, give metoclopramide (REGLAN) if ordered (step 3 of nausea and vomiting management)               Reason statin medication order not selected  Freq: DOES NOT GO TO MAR Route: XX  PRN Reason: other  Start: 08/17/17 1522   Admin Instructions:  Hospice, comfort care              Completed Medications  Medications 08/12/17 08/13/17 08/14/17 08/15/17 08/16/17 08/17/17 08/18/17         Dose: 300 mg Freq: ONCE Route: RE  Start: 08/16/17 0021   End: 08/16/17 0052        0052 (300 mg)-Given               Dose: 120 mL Freq: ONCE Route: IV  Start: 08/15/17 2242   End: 08/15/17 2313       2313 (120 mL)-Given                Dose: 100 mL Freq: ONCE Route: IV  Start: 08/15/17 2242   End: 08/15/17 2312   Admin Instructions: This entry is for use by Radiology to intermittently used as a flush in patients receiving a CT scan.        2312 (100 mL)-Given             Discontinued Medications  Medications 08/12/17 08/13/17 08/14/17 08/15/17 08/16/17 08/17/17 08/18/17         Dose: 0.1-0.4 mg Freq: EVERY 2 MIN PRN Route: IV  PRN Reason: opioid reversal  Start: 08/16/17 0218   End: 08/16/17 0219   Admin Instructions: For respiratory rate LESS than or EQUAL to 8.  Partial reversal dose:  0.1 mg titrated q 2 minutes for Analgesia Side Effects Monitoring Sedation Level of 3 (frequently drowsy, arousable, drifts to sleep during conversation).Full reversal dose:  0.4 mg bolus for Analgesia Side Effects Monitoring Sedation Level of 4 (somnolent, minimal or no response to stimulation).         0219-Med Discontinued

## 2017-08-15 NOTE — IP AVS SNAPSHOT
MRN:[id number]                      After Visit Summary   8/15/2017    Gisela Abad    MRN: [id number]           Thank you!     Thank you for choosing [facility] for your care. Our goal is always to provide you with excellent care. Hearing back from our patients is one way we can continue to improve our services. Please take a few minutes to complete the written survey that you may receive in the mail after you visit with us. Thank you!        Patient Information     Date Of Birth          12/12/1924        About your hospital stay     You were admitted on:  August 16, 2017 You last received care in the:  [place] [address] [facility]    You were discharged on:  August 18, 2017       Who to Call     For medical emergencies, please call 911.  For non-urgent questions about your medical care, please call your primary care provider or clinic, [phone]          Attending Provider     Provider Specialty    Celi Lamar MD Emergency Medicine    [facility]       Primary Care Provider Office Phone # Fax #    Mike Maria Teresa Kwan [phone]      After Care Instructions     Activity - Up with nursing assistance           Advance Diet as Tolerated       Follow this diet upon discharge: Orders Placed This Encounter      Advance Diet as Tolerated: Clear Liquid Diet            General info for SNF       Length of Stay Estimate: Short Term Care: Estimated # of Days <30  Condition at Discharge: Declining  Level of care:skilled   Rehabilitation Potential: N/A  Admission H&P remains valid and up-to-date: Yes  Recent Chemotherapy: N/A  Use Nursing Home Standing Orders: Yes            Mantoux instructions       Give two-step Mantoux (PPD) Per Facility Policy Yes                  Follow-up Appointments     Follow Up and recommended labs and tests       Per hospice facility                  Pending Results     No orders found from 8/13/2017 to  8/16/2017.            Statement of Approval     Ordered          08/17/17 1523  I have reviewed and agree with all the recommendations and orders detailed in this document.  EFFECTIVE NOW     Approved and electronically signed by:  Maria Isabel Chun MD             Admission Information     Date & Time Provider Department Dept. Phone    8/15/2017 Agustina Roth MD [place] [address] [facility] [phone]      Your Vitals Were     Blood Pressure Temperature Respirations Weight Pulse Oximetry BMI (Body Mass Index)    145/72 98.2  F (36.8  C) (Oral) 16 71.2 kg (157 lb) 97% 28.03 kg/m2      MyChart Information     [facility] gives you secure access to your electronic health record. If you see a primary care provider, you can also send messages to your care team and make appointments. If you have questions, please call your primary care clinic.  If you do not have a primary care provider, please call [phone] and they will assist you.        Care EveryWhere ID     This is your Care EveryWhere ID. This could be used by other organizations to access your [facility] medical records  [id number]        Equal Access to Services     ALEX MORA : Hadessence Jack, siena barrett, so tavera. So [facility] [phone].    ATENCIÓN: Si habla español, tiene a wu disposición servicios gratuitos de asistencia lingüística. LlOur [facility] [phone].    We comply with applicable federal civil rights laws and [place] laws. We do not discriminate on the basis of race, color, national origin, age, disability sex, sexual orientation or gender identity.               Review of your medicines      UNREVIEWED medicines. Ask your doctor about these medicines        Dose / Directions    FUROSEMIDE PO        Dose:  20 mg   Take 20 mg by mouth daily   Refills:  0         START taking        Dose / Directions    * acetaminophen 650 MG Suppository   Commonly known as:   TYLENOL   Used for:  End of life care        Dose:  650 mg   Place 1 suppository (650 mg) rectally every 4 hours as needed for fever or mild pain (Do not exceed 4000 mg total acetaminophen per day.) Unwrap prior to insertion.   Quantity:  12 suppository   Refills:  1       * acetaminophen 325 MG tablet   Commonly known as:  TYLENOL   Used for:  End of life care        Dose:  325-650 mg   Take 1-2 tablets (325-650 mg) by mouth every 4 hours as needed for mild pain or fever   Quantity:  100 tablet   Refills:  1       atropine 1 % ophthalmic solution   Used for:  End of life care        Dose:  2-4 drop   Take 2-4 drops by mouth, place under tongue or place inside cheek every 2 hours as needed for other (terminal respiratory secretions) Not for ophthalmic use.   Quantity:  5 mL   Refills:  1       haloperidol 2 MG/ML (HIGH CONC) solution   Commonly known as:  HALDOL   Used for:  End of life care        Dose:  0.5-1 mg   Take 0.25-0.5 mLs (0.5-1 mg) by mouth, place under tongue or insert rectally every 6 hours as needed for agitation (nausea)   Quantity:  30 mL   Refills:  1       HYDROmorphone 10 mg/mL Liqd (HIGH CONC) solution   Commonly known as:  DILAUDID high concentration   Used for:  End of life care        Dose:  1-2 mg   Take 0.1-0.2 mLs (1-2 mg) by mouth or place under tongue every 2 hours as needed for moderate to severe pain (and/or??shortness of breath)   Quantity:  30 mL   Refills:  0       LORazepam 2 MG/ML (HIGH CONC) solution   Commonly known as:  ATIVAN   Used for:  End of life care        Dose:  0.25-0.5 mg   Take 0.13-0.25 mLs (0.26-0.5 mg) by mouth, place under tongue or insert rectally every 4 hours as needed for anxiety (restlessness)   Quantity:  30 mL   Refills:  1       MEDICATION INSTRUCTION   Used for:  End of life care        If care facility cannot accept or use ranges, facility is instructed to use lower end of dosing range   Refills:  0       * Notice:  This list has 2 medication(s) that  are the same as other medications prescribed for you. Read the directions carefully, and ask your doctor or other care provider to review them with you.      CONTINUE these medicines which have NOT CHANGED        Dose / Directions    albuterol 108 (90 BASE) MCG/ACT Inhaler   Commonly known as:  PROAIR HFA/PROVENTIL HFA/VENTOLIN HFA   Used for:  Acute bronchospasm        Dose:  2 puff   Inhale 2 puffs into the lungs every 6 hours as needed for shortness of breath / dyspnea or wheezing   Quantity:  1 Inhaler   Refills:  0       order for DME   Used for:  Memory loss        Equipment being ordered: MD2 Medication Dispenser   Quantity:  1 Device   Refills:  0       triamcinolone 0.1 % cream   Commonly known as:  KENALOG        Apply topically 3 times daily as needed for irritation (legs)   Refills:  0         STOP taking     aspirin 81 MG tablet           atorvastatin 40 MG tablet   Commonly known as:  LIPITOR           CENTRUM SILVER per tablet           diltiazem 60 MG 12 hr capsule   Commonly known as:  CARDIZEM SR           METOPROLOL TARTRATE PO                Where to get your medicines      Some of these will need a paper prescription and others can be bought over the counter. Ask your nurse if you have questions.     You don't need a prescription for these medications     acetaminophen 325 MG tablet    acetaminophen 650 MG Suppository    atropine 1 % ophthalmic solution    haloperidol 2 MG/ML (HIGH CONC) solution    MEDICATION INSTRUCTION         Information about where to get these medications is not yet available     ! Ask your nurse or doctor about these medications     HYDROmorphone 10 mg/mL Liqd (HIGH CONC) solution    LORazepam 2 MG/ML (HIGH CONC) solution                Protect others around you: Learn how to safely use, store and throw away your medicines at www.disposemymeds.org.             Medication List: This is a list of all your medications and when to take them. Check marks below indicate your  daily home schedule. Keep this list as a reference.      Medications           Morning Afternoon Evening Bedtime As Needed    * acetaminophen 650 MG Suppository   Commonly known as:  TYLENOL   Place 1 suppository (650 mg) rectally every 4 hours as needed for fever or mild pain (Do not exceed 4000 mg total acetaminophen per day.) Unwrap prior to insertion.                                * acetaminophen 325 MG tablet   Commonly known as:  TYLENOL   Take 1-2 tablets (325-650 mg) by mouth every 4 hours as needed for mild pain or fever                                albuterol 108 (90 BASE) MCG/ACT Inhaler   Commonly known as:  PROAIR HFA/PROVENTIL HFA/VENTOLIN HFA   Inhale 2 puffs into the lungs every 6 hours as needed for shortness of breath / dyspnea or wheezing                                atropine 1 % ophthalmic solution   Take 2-4 drops by mouth, place under tongue or place inside cheek every 2 hours as needed for other (terminal respiratory secretions) Not for ophthalmic use.                                FUROSEMIDE PO   Take 20 mg by mouth daily                                haloperidol 2 MG/ML (HIGH CONC) solution   Commonly known as:  HALDOL   Take 0.25-0.5 mLs (0.5-1 mg) by mouth, place under tongue or insert rectally every 6 hours as needed for agitation (nausea)                                HYDROmorphone 10 mg/mL Liqd (HIGH CONC) solution   Commonly known as:  DILAUDID high concentration   Take 0.1-0.2 mLs (1-2 mg) by mouth or place under tongue every 2 hours as needed for moderate to severe pain (and/or??shortness of breath)                                LORazepam 2 MG/ML (HIGH CONC) solution   Commonly known as:  ATIVAN   Take 0.13-0.25 mLs (0.26-0.5 mg) by mouth, place under tongue or insert rectally every 4 hours as needed for anxiety (restlessness)                                MEDICATION INSTRUCTION   If care facility cannot accept or use ranges, facility is instructed to use lower end of dosing  range                                order for DME   Equipment being ordered: MD2 Medication Dispenser                                triamcinolone 0.1 % cream   Commonly known as:  KENALOG   Apply topically 3 times daily as needed for irritation (legs)                                * Notice:  This list has 2 medication(s) that are the same as other medications prescribed for you. Read the directions carefully, and ask your doctor or other care provider to review them with you.

## 2017-08-15 NOTE — IP AVS SNAPSHOT
[address], [address]    [facility] [address]    Phone:  [phone]                                       After Visit Summary   8/15/2017    Gisela Abad    MRN: [id number]           After Visit Summary Signature Page     I have received my discharge instructions, and my questions have been answered. I have discussed any challenges I see with this plan with the nurse or doctor.    ..........................................................................................................................................  Patient/Patient Representative Signature      ..........................................................................................................................................  Patient Representative Print Name and Relationship to Patient    ..................................................               ................................................  Date                                            Time    ..........................................................................................................................................  Reviewed by Signature/Title    ...................................................              ..............................................  Date                                                            Time

## 2017-08-16 PROBLEM — I63.9 STROKE (H): Status: ACTIVE | Noted: 2017-01-01

## 2017-08-16 NOTE — ED NOTES
Bed: ST01  Expected date: 8/15/17  Expected time: 10:25 PM  Means of arrival: Ambulance  Comments:  Claudette [age]F stroke symptoms, speech last normal 2040

## 2017-08-16 NOTE — PHARMACY-ADMISSION MEDICATION HISTORY
Admission medication history interview status for the 8/15/2017  admission is complete. See EPIC admission navigator for prior to admission medications     Medication history source reliability:Good    Actions taken by pharmacist (provider contacted, etc): information taken from [facility]     Additional medication history information not noted on PTA med list :None    Medication reconciliation/reorder completed by provider prior to medication history? No    Time spent in this activity: 20 min    Prior to Admission medications    Medication Sig Last Dose Taking? Auth Provider   FUROSEMIDE PO Take 20 mg by mouth daily 8/15/2017 at am Yes Unknown, Entered By History   METOPROLOL TARTRATE PO Take 50 mg by mouth 2 times daily 8/15/2017 at am Yes Unknown, Entered By History   triamcinolone (KENALOG) 0.1 % cream Apply topically 3 times daily as needed for irritation (legs) prn Yes Unknown, Entered By History   albuterol (PROAIR HFA/PROVENTIL HFA/VENTOLIN HFA) 108 (90 BASE) MCG/ACT Inhaler Inhale 2 puffs into the lungs every 6 hours as needed for shortness of breath / dyspnea or wheezing prn Yes Valerie Mcallister MD   aspirin 81 MG tablet Take 1 tablet (81 mg) by mouth daily 8/15/2017 at am Yes Mike Kwan MD   atorvastatin (LIPITOR) 40 MG tablet Take 1 tablet (40 mg) by mouth daily 8/15/2017 at am Yes Mike Kwan MD   diltiazem (CARDIZEM SR) 60 MG 12 hr capsule Take 1 capsule (60 mg) by mouth 2 times daily 8/15/2017 at am Yes Esperanza King PA-C   Multiple Vitamins-Minerals (CENTRUM SILVER) per tablet Take 1 tablet by mouth daily 8/15/2017 at am Yes Unknown, Entered By History   order for DME Equipment being ordered: MD2 Medication Dispenser   Mike Kwan MD

## 2017-08-16 NOTE — PLAN OF CARE
Problem: Goal Outcome Summary  Goal: Goal Outcome Summary  Outcome: No Change  Pt admitted tonight post stroke. Bruises on upper Left shoulder and lower back on admission. L sided weakness and L sided facial droop. Comfort cares ordered per pt wishes. Family stated she stopped coumadin on her own 5/1. Repositioned Q2 hours. Will continue to monitor.

## 2017-08-16 NOTE — ED NOTES
[facility]  ED Nurse Handoff Report    ED Chief complaint: No chief complaint on file.      ED Diagnosis:   Final diagnoses:   Stroke (H)       Code Status: DNR / DNI    Allergies:   Allergies   Allergen Reactions     Amoxicillin      Sulfa Drugs        Activity level - Baseline/Home:  Stand with Assist    Activity Level - Current:   Total Care     Needed?: No    Isolation: No  Infection: Not Applicable    Bariatric?: No    Vital Signs:   Vitals:    08/15/17 2321 08/15/17 2342 08/16/17 0008 08/16/17 0009   BP:    146/85   Resp:  18 25 20   Temp: 98.2  F (36.8  C)      TempSrc: Oral      SpO2:  94% 95% 97%   Weight:           Cardiac Rhythm: ,   Cardiac  Cardiac Rhythm: Atrial fibrillation    Pain level:      Is this patient confused?: No    Patient Report: Initial Complaint: Patient was found on floor by facility staff at 2140,  Last known well time was 2040.  Patient comes from [facility].  Patient has a hx of Afib, was taken off of Coumadin on May 1.  Patient presented with left sided facial droop, left sided faccid arm/leg. Code stroke called.   Focused Assessment: Slurred speech, left sided facial droop, left sided neglect.  Sensation to touch improved from first assessement.  Patient denies pain.   Tests Performed: CT/CTA, Labs  Abnormal Results: See results  Treatments provided: ASA suppository, NS bolus    Family Comments: Family at bedside.     OBS brochure/video discussed/provided to patient: No    ED Medications:   Medications   aspirin Suppository 300 mg (not administered)   sodium chloride 0.9 % for CT scan flush dose 100 mL (100 mLs Intravenous Given 8/15/17 2312)   iopamidol (ISOVUE-370) solution 120 mL (120 mLs Intravenous Given 8/15/17 2313)       Drips infusing?:  No      ED NURSE PHONE NUMBER: [phone]

## 2017-08-16 NOTE — ED NOTES
Family went home for evening, they are available by phone if needed.  Damian-- patient's son is POA- can be reached at  (m) [phone] or (h) [phone]

## 2017-08-16 NOTE — ED NOTES
Patient repositioned, patient awake, makes needs known.  Patient moves right side independently with strong  strength, no movement noted to left arm.  Patient moving legs, wiggling down in bed.  Patient denies pain at this time. Family at bedside.

## 2017-08-16 NOTE — ED NOTES
Report received from LILA Borden.  Care assumed at this time.  Family at bedside.  Side rails up for safety, call light within reach.

## 2017-08-16 NOTE — H&P
DATE OF ADMISSION:  08/16/2017.      PRIMARY CARE PHYSICIAN:  Dr. Mike Kwan.       CHIEF COMPLAINT:  Facial droop.      HISTORY OF PRESENT ILLNESS:  Gisela Abad is a 89-year-old woman with medical history significant for chronic atrial fibrillation, rheumatoid arthritis, hypercholesterolemia.  She was brought to the ER by EMS for evaluation of facial droop.  I discussed with patient's son and daughter-in-law, present at bedside, reviewed her record in Epic, and also discussed with Dr. Lamar, ED physician who evaluated the patient in the ER, for further information.  I was able to obtain very limited history from the patient.      The patient is living in [facility] for the last 6 months.  She has a history of chronic atrial fibrillation and was maintained on Coumadin, which patient desired to not continue and so it was stopped 3 months ago.  The patient had a fall 2 days ago in the evening which she was having decreased oral intake and fell due to weakness from dehydration and decreased p.o. intake.  Family visited her this morning and at noon and felt she had a flat affect and was slightly confused.  She was not engaged in conversation, but otherwise she was in her usual state up until 8:40 p.m.  An hour later, around 9:40 p.m., the patient was found on the floor in her apartment with her face down and was noted to have left-sided facial droop, left weakness and right-sided gaze.  911 was called.  She was mildly tachycardic but was hypertensive and had a blood sugar of 149 en route.       The patient expressed no pain, palpitation, headache, shortness of breath.      In the ER, the patient was evaluated by Dr. Lamar.  She was noted to have left facial droop and left weakness and right-sided gaze preference.  Code stroke was called and was sent for CT head and CT angiogram of head and neck which showed decrease in caliber and density of the contrast within the  distal right internal carotid artery through its supraclinoid portion and the M1 segment of the right middle cerebral artery.  No significant contrast seen within the M2 to M4 branches of the right middle cerebral artery.  Given this finding and her presentation, this was reviewed with Dr. Suresh from Neurology and with the patient's family.  The patient was DNR/DNI and did not want to go through any aggressive interventions as per her son ever since her visit to primary care provider a few months ago.  A decision was made that no medical treatment be continued for her stroke including further workup and family desired to continue comfort care only at this point.  I had an at length conversation with the patient's son and daughter-in-law present at bedside.  She is being admitted for comfort care and hospice consult.      REVIEW OF SYSTEMS:  All 10-point systems were reviewed and negative other than mentioned in the HPI.      PAST MEDICAL HISTORY:   1.  Atrial fibrillation, chronic.   2.  Hypercholesterolemia.   3.  Rheumatoid arthritis.   4.  Glaucoma.   5.  Osteoarthrosis.      PAST SURGICAL HISTORY:   1.  Thyroid surgery.   2.  Total left hip arthroplasty.      FAMILY HISTORY:  Mom  of CVA at age 94 and father  of coronary artery disease in the 80s.  Sister had pancreatic cancer and brother had lung cancer.      SOCIAL HISTORY:  Never smoked, used to drink alcohol socially.  She was living in an assisted living facility named [facility] for the last 6 months.      ALLERGIES:  Amoxicillin and sulfa.      HOME MEDICATIONS:   1.  Lasix 10 mg by mouth daily.   2.  Albuterol 2 puffs inhalation every 6 hours as needed.   3.  Aspirin 81 mg by mouth daily.   4.  Lipitor 40 mg by mouth daily.   5.  Cardizem-SR 60 mg by mouth twice a day.   6.  Metoprolol 100 mg by mouth twice a day.   7.  Cholecalciferol 1 tab by mouth daily.   8.  Multivitamin 1 tab by mouth daily.      PHYSICAL EXAMINATION:    GENERAL:  The patient is somnolent but arousable and answers questions intermittently, but goes back to sleep quickly.  She knows she is in hospital and was brought by EMS and recognizes family as well as knows her birthdate.  She is calm.   HEENT:  Pupils are bilaterally equal, round, reactive to light, and has a right gaze preference.   NECK:  Supple.   LUNGS:  Bilateral equal air entry but with scattered wheezing, no respiratory distress, remains on room air.   CARDIOVASCULAR:  S1, S2, irregular, no tachycardia, no murmur.   ABDOMEN:  Soft, nontender, bowel sounds active.   MUSCULOSKELETAL:  No edema.   NEUROLOGIC:  Somnolent but arousable and answers questions appropriately, has a right gaze preference, left-sided neglect and left-sided facial droop noted.  Left upper extremity with increased tone and with strength of 2/5 noted.  Left lower extremity strength is 4/5.  Right is normal.      LABORATORY DATA:  Sodium 136, potassium 3.8, chloride 101, bicarbonate 25, BUN 20, creatinine 1.12.  Blood sugar 131.  WBC 10.3, hemoglobin 13.9, hematocrit 40.7, platelet 219.  INR 1.07.      IMAGING:  CT head with and without contrast, report is pending, but CT angiogram of the head and neck showed decrease in caliber and density of the contrast within the distal right internal carotid artery through its supraclinoid portion in the M1 segment of the right MCA, no significant contrast seen within the M2 to M4 branches of the right MCA.      A 12-lead EKG showed atrial fibrillation.      ASSESSMENT AND PLAN:  Gisela Abad is a 89-year-old woman with history of chronic atrial fibrillation, off anticoagulation since May of this year, was brought to the emergency room for evaluation of left-sided facial droop and left-sided weakness.   1.  Acute ischemic stroke involving right middle cerebral artery territory.   2.  Atrial fibrillation with rapid ventricular response.   3.  Hyperlipidemia.   4.  Hypertension.   5.   Glaucoma.   6.  Osteoarthritis.   7.  Rheumatoid arthritis.      PLAN:   1.  After extensive conversation with patient and family, including her son and daughter-in-law, it was decided to not treat her condition medically and rather be proceeded for comfort care only.  The patient was DNR/DNI.  No further workup for stroke and any further labs will be obtained.  Comfort care orders placed.   consult for hospice referral.  Medications only for comfort care ordered, will not continue PTA medications.  Family preferring no speech therapy evaluation, diet for pleasure as per patient's wishes.  Will leave her on IV fluid overnight.  We will have to discuss with the family regarding discontinuation of IV fluid as well tomorrow.   2..  Deep venous thrombosis prophylaxis not required.   3.   May place Choi catheter for comfort.      CODE STATUS:  DNR/DNI and comfort care.      I spent more than 50 minutes on overall care plan and in family meeting.         TORY ROJAS MD             D: 2017 01:35   T: 2017 02:36   MT: terri      Name:     KIMI SEWELL   MRN:      [id number]        Account:      [id number]   :      1924           Admitted:     [id number]      Document: [id number]       cc: Mike Kwan MD

## 2017-08-16 NOTE — PLAN OF CARE
Problem: Goal Outcome Summary  Goal: Goal Outcome Summary  Outcome: No Change  Alert and oriented to place and self. Denies pain. Facial droop and L sided weakness. T/R q2, incontinent of urine. Bruising on L shoulder and back. Tolerating sips of clears with supervision. Plan for hospice at [facility], referrals sent by [facility]

## 2017-08-16 NOTE — PLAN OF CARE
Problem: Discharge Planning  Goal: Discharge Planning (Adult, OB, Behavioral, Peds)  Patient's goal is to d/c to [facility] via stretcher at 930 on 8/18

## 2017-08-16 NOTE — ED PROVIDER NOTES
History     Chief Complaint:  Facial Droop     History limited due to patient's acuity of condition and subsequently provided by EMS.   PAUL Abad is a 89 year old female with history of chronic atrial fibrillation who presents to the emergency department today via EMS for evaluation of a left-sided facial droop. Per EMS report, the patient was last known to be normal at 2040, and was found at 2140 to have a pronounced left-sided facial droop at which time EMS was called. En route, the patient's heart rate was 110, blood pressure was 178/108, and had a measured blood glucose of 149. The patient has been off of her coumadin since 5/1 per her own choice.     Allergies:  Amoxicillin   Sulfa drugs      Medications:    furosemide (LASIX) 20 MG tablet  azithromycin (ZITHROMAX) 250 MG tablet  albuterol (PROAIR HFA/PROVENTIL HFA/VENTOLIN HFA) 108 (90 BASE) MCG/ACT Inhaler  aspirin 81 MG tablet  atorvastatin (LIPITOR) 40 MG tablet  diltiazem (CARDIZEM SR) 60 MG 12 hr capsule  metoprolol (LOPRESSOR) 50 MG tablet  Cholecalciferol (VITAMIN D PO)     Past Medical History:    Atrial fibrillation   Generalized osteoarthrosis, unspecified site   Glaucoma   Pure hypercholesterolemia   Rheumatoid arthritis     Past Surgical History:    Left hip replacement   Thyroid surgery     Family History:    Cerebrovascular disease   CAD   Pancreatic cancer   Lung cancer     Social History:  The patient was accompanied to the ED by EMS.  Smoking Status: Never smoker   Smokeless Tobacco: Never used   Alcohol Use: Yes    Marital Status:        Review of Systems   Unable to perform ROS: Acuity of condition     Physical Exam     Patient Vitals for the past 24 hrs:   BP Temp Temp src Heart Rate Resp SpO2 Weight   08/16/17 0009 146/85 - - 101 20 97 % -   08/16/17 0008 - - - 112 25 95 % -   08/15/17 2342 - - - 104 18 94 % -   08/15/17 2321 - 98.2  F (36.8  C) Oral - - - -   08/15/17 2320 - - - 107 - 96 % -   08/15/17 2319 (!)  159/99 - - - - - -   08/15/17 2318 - - - 104 20 97 % -   08/15/17 2240 (!) 141/121 - - 116 20 - 71.2 kg (157 lb)        Physical Exam  Physical Exam   Constitutional:  Frail elderly 89 year old female laying supine in bed.   HENT:    Rightward gaze deviation. No conjunctival injection. No tongue laceration. Mucous membranes are dry. Pupils are 3 mm and equal.   Neck:   Supple.   Cardiovascular: Irregularly irregular rhythm, mildly tachycardic rate and normal heart sounds.  Exam reveals no gallop and no friction rub.  No murmur heard.  Pulmonary/Chest:  Effort normal and breath sounds normal. Patient has no wheezes. Patient has no rales.   Abdominal:   Soft. Bowel sounds are normal. Patient exhibits no mass. There is no focal tenderness. There is no rebound and no guarding.   Musculoskeletal:  Normal range of motion. Patient exhibits no edema.   Neurological:   Patient has rightward gaze, left sided facial droop, left sided hemiparesis with left sided neglect. Sensory deficits on the left upper and lower extremity. Please see the NIH stroke scale.   Skin:   Skin is warm and dry. No rash noted. No erythema.   Psychiatric:   Unable to assess.      National Institutes of [employer] Stroke Scale  Exam Interval: Pre-CT    Score    Level of consciousness: (0)   Alert, keenly responsive    LOC questions: (1)   Answers one question correctly    LOC commands: (0)   Performs both tasks correctly    Best gaze: (2)   Forced deviation    Visual: (0)   No visual loss    Facial palsy: (2)   Partial paralysis (total/near total of lower face)    Motor arm (left): (3)   No effort against gravity    Motor arm (right): (0)   No drift    Motor leg (left): (3)   No effort against gravity    Motor leg (right): (0)   No drift    Limb ataxia: (0)   Absent    Sensory: (2)   Severe to total sensory loss    Best language: (0)   Normal- no aphasia    Dysarthria: (0)   Normal    Extinction and inattention: (2)   Profound mohamud-inattention / extinction  > one modality        Total Score:  15      National Institutes of [employer] Stroke Scale  Exam Interval: Post-CT   Score    Level of consciousness: (0)   Alert, keenly responsive    LOC questions: (1)   Answers one question correctly    LOC commands: (0)   Performs both tasks correctly    Best gaze: (2)   Forced deviation    Visual: (0)   No visual loss    Facial palsy: (2)   Partial paralysis (total/near total of lower face)    Motor arm (left): (0)   No drift    Motor arm (right): (0)   No drift    Motor leg (left): (2)   Some effort against gravity    Motor leg (right): (0)   No drift    Limb ataxia: (0)   Absent    Sensory: (0)   Normal- no sensory loss    Best language: (0)   Normal- no aphasia    Dysarthria: (0)   Normal    Extinction and inattention: (0)   No abnormality        Total Score:  7      Emergency Department Course     ECG:  Indication: Stroke like symptoms   Completed at 2312.  Read at 2318.   Atrial fibrillation. Abnormal ECG.   Rate 98 bpm. MO interval *. QRS duration 84. QT/QTc 330/421. P-R-T axes * 57 34.     Imaging:  Radiology findings were communicated with the patient, family and Admitting MD who voiced understanding of the findings.    CT Head w/o Contrast:  IMPRESSION:   1. Atrophy and chronic white matter disease.  2. Nothing acute.  3. CT angiogram to follow.  Report per radiology      CT Head w/ Contrast:  IMPRESSION:   1. Atrophy and chronic white matter disease.  2. Nothing acute.  3. CT angiogram to follow.  Report per radiology      CTA Angiogram Head Neck:  CONCLUSION:   HEAD CTA  1. On the head CTA there is a motion leading to sub-optimal visualization of the fine detail.   2. There is a decrease in caliber and density of the contrast seen within the distal right internal carotid artery through its supraclinoid portion and the M1 segment of the right middle cerebral artery. There is no significant contrast seen within the M2 to M4 branches of hte right cerebral artery. Recommend  clinical correlation for symptoms. An MRI may be helpful for further evaluation.   3. There is no discrete aneurysm or vascular malformation noted involving the arteries of the brain by CTA standards.   NECK CTA:  1. Normal configuration of the great vessels off the aortic arch.   2. No significant or irregularity noted involving the arteries of the neck by NASCET criteria.   Report per radiology      Laboratory:  Laboratory findings were communicated with the patient, family and Admitting MD who voiced understanding of the findings.    CBC: WBC 10.3, HGB 13.9,    CMP: Glucose 131 (H), Creatinine 1.12 (H), GFR Estimate 45 (L), o/w WNL.   INR: 1.07   PTT: 26     Emergency Department Course:  2234 I performed an exam of the patient as documented above.  2234 I activated a code stroke on the patient.   2235 IV was inserted and blood was drawn for laboratory testing, results above.   2239 I spoke with Dr. Suresh of the Neurology service regarding patient's presentation, findings, and plan of care.   2240 The patient was sent for a CT Head w/o Contrast, CT Head w Contrast, CTA Angiogram Head Neck while in the emergency department, results above.    2258 The patient's family is here in the emergency department. I spoke with them about the patient's presentation, findings, and plan of care. Patient's family is comfortable with keeping the patient on comfort care at this time. Code stroke called off.   2302 I spoke with Dr. Suresh of the Neurology service regarding patient's presentation, findings, and plan of care. Dr. Suresh was on her way in and will not come see the patient as code stroke has been called off.   2312 EKG obtained in the ED, see results above.    0012 I spoke with Dr. Rubio of the Neuroradiology service from [facility] regarding patient's presentation, findings, and plan of care.   0018 I spoke with Dr. Roth of the Hospitalist service regarding patient's presentation, findings,  and plan of care.   I discussed the treatment plan with the patient. They expressed understanding of this plan and consented to admission. I discussed the patient with Dr. Roth, who will admit the patient to a monitored bed for further evaluation and treatment. I personally reviewed the laboratory and imaging results with the Patient and son and answered all related questions prior to admission.   Impression & Plan      Medical Decision Making:  Gisela Abad is a 89 year old female presenting with acute stroke symptoms with a last known well time. On her exam here in stabilization room she had significant neurologic deficits so code stroke was called. I spoke with Dr. Suresh as the patient was going off to CT. Information was very limited initially as EMS just dropped her off without much report and no paperwork. I was able to obtain some information from a quick glance at her chart prior to going to CT. She does appear to have atrial fibrillation. It appears as though she was anticoagulated up until May and then they stopped this. When the son arrived he provided further information stating that the patient herself elected to stop it as she was declining and really wanted minimal interventions. She did understand and the family understood that a stroke could happen.     The patient's family further states she has been declining recently but no acute illnesses.    After family arrived and stated that she would not any acute interventions such as thrombectomy or tPA I spoke with Dr. Suresh again as there would be no need for her to come in being we would not do any aggressive interventions. TPA was not indicated due to personal wishes of the patient. The patient's CT of her head came back without anything acute. I did speak with [facility] later regarding the angiogram which does show findings concerning for a clot in the M2 branch consistent with her symptoms.    At this time the patient has  some significant improvement in her neurologic exam after returning from CT scan. She does now have movement of the left side of her body as well as full sensory recovery. She still has left sided facial droop and a rightward gaze. At this time she was given rectal aspirin and I will admit her to Dr. Roth.         Critical Care time:  was 45 minutes for this patient excluding procedures.    Diagnosis:    ICD-10-CM    1. Stroke (H) I63.9      Disposition:  Admitted to Neuro under the supervision of Dr. Roth.     Scribe Disclosure:  I, Milton Reyes, am serving as a scribe at 10:36 PM on 8/15/2017 to document services personally performed by Celi Lamar MD based on my observations and the provider's statements to me.       Celi Lamar MD  08/22/17 0764

## 2017-08-17 NOTE — PLAN OF CARE
Problem: Discharge Planning  Goal: Discharge Planning (Adult, OB, Behavioral, Peds)  Patient is to d/c to [facility] via stretcher @ 170 on 8/18

## 2017-08-17 NOTE — PLAN OF CARE
Problem: Goal Outcome Summary  Goal: Goal Outcome Summary  Outcome: No Change  Alert and oriented to place and self. C/o pain/headache prn oxycodone given x1, effective. . Facial droop and L sided weakness. T/R q2, incontinent of urine. Oral cares provided.  Bruising on L shoulder and back. Clear liquid diet, pt states it hurts to drink anything. Plan for hospice at [place], referrals sent by . Will continue to monitor.

## 2017-08-17 NOTE — PLAN OF CARE
Problem: Goal Outcome Summary  Goal: Goal Outcome Summary  Outcome: No Change  Nursing note  Pt alert to place and person. Repositioned q 2 hrs. Very poor appetite. Pt denies any pain. Incontinent to bladder. Will continue to monitor.

## 2017-08-17 NOTE — PLAN OF CARE
Problem: Goal Outcome Summary  Goal: Goal Outcome Summary  Outcome: No Change  Alert and oriented to place and self. Denied pain. T/R q2, incontinent of urine x1. Oral cares provided at bedside. Bruising on L shoulder and back intact. Clear liquid diet, tolerating sips. Plan for hospice at [place], referrals sent by SW. Will continue to monitor.

## 2017-08-18 NOTE — PLAN OF CARE
Problem: Goal Outcome Summary  Goal: Goal Outcome Summary  Outcome: Improving  A/o to self and place, pleasant. Respirations WDL. Large bruise on L back side. Incontinent, repos q2h. Frequent oral cares. Planning for DC to [facility] today 8/18 at 0930.

## 2017-08-18 NOTE — PLAN OF CARE
Problem: Goal Outcome Summary  Goal: Goal Outcome Summary  A/o to self and place, pleasant. Denying pain/ nausea. Respirations WDL. Large bruise on L back side. Incontinent, repos q2h. Planning for DC to [facility] at 0930.

## 2017-08-18 NOTE — DISCHARGE SUMMARY
[facility]    Discharge Summary  Hospitalist    Date of Admission:  8/15/2017  Date of Discharge:  8/18/2017  9:40 AM  Discharging Provider: Maria Isabel Chun MD    Discharge Diagnoses   Acute CVA    History of Present Illness   Gisela Abad is a 89-year-old woman with medical history significant for chronic atrial fibrillation, rheumatoid arthritis, hypercholesterolemia.  She was brought to the ER by EMS for evaluation of facial droop.  I discussed with patient's son and daughter-in-law, present at bedside, reviewed her record in Epic, and also discussed with Dr. Lamar, ED physician who evaluated the patient in the ER, for further information.  I was able to obtain very limited history from the patient.       The patient is living in [facility] for the last 6 months.  She has a history of chronic atrial fibrillation and was maintained on Coumadin, which patient desired to not continue and so it was stopped 3 months ago.  The patient had a fall 2 days ago in the evening which she was having decreased oral intake and fell due to weakness from dehydration and decreased p.o. intake.  Family visited her this morning and at noon and felt she had a flat affect and was slightly confused.  She was not engaged in conversation, but otherwise she was in her usual state up until 8:40 p.m.  An hour later, around 9:40 p.m., the patient was found on the floor in her apartment with her face down and was noted to have left-sided facial droop, left weakness and right-sided gaze.  911 was called.  She was mildly tachycardic but was hypertensive and had a blood sugar of 149 en route.        The patient expressed no pain, palpitation, headache, shortness of breath.       In the ER, the patient was evaluated by Dr. Lamar.  She was noted to have left facial droop and left weakness and right-sided gaze preference.  Code stroke was called and was sent for CT head and CT  angiogram of head and neck which showed decrease in caliber and density of the contrast within the distal right internal carotid artery through its supraclinoid portion and the M1 segment of the right middle cerebral artery.  No significant contrast seen within the M2 to M4 branches of the right middle cerebral artery.  Given this finding and her presentation, this was reviewed with Dr. Suresh from Neurology and with the patient's family.  The patient was DNR/DNI and did not want to go through any aggressive interventions as per her son ever since her visit to primary care provider a few months ago.  A decision was made that no medical treatment be continued for her stroke including further workup and family desired to continue comfort care only at this point.   She was admitted for comfort care and hospice consult.     Hospital Course   Patient had an uneventful hospital course, she remained comfortable. The family was comfortable with the decision to pursue no aggressive care and focus on comfort, she was discharged to [facility] facility.    Maria Isabel Chun MD          Unresulted Labs Ordered in the Past 30 Days of this Admission     No orders found from 6/16/2017 to 8/16/2017.          Code Status   DNR / DNI       Primary Care Physician   Mike Kwan MD    Physical Exam             Resp: 16        Vitals:    08/15/17 2240   Weight: 71.2 kg (157 lb)     Vital Signs with Ranges  Resp:  [16] 16       Constitutional: Awake, comfortable    Discharge Disposition   Discharged to [facility] hospice facility  Condition at discharge: Terminal    Consultations This Hospital Stay   SOCIAL WORK IP CONSULT    Time Spent on this Encounter   Maria Isabel HERNANDEZ, personally saw the patient today and spent greater than 30 minutes discharging this patient.    Discharge Orders     General info for SNF   Length of Stay Estimate: Short Term Care: Estimated # of Days <30  Condition at Discharge:  Declining  Level of care:skilled   Rehabilitation Potential: N/A  Admission H&P remains valid and up-to-date: Yes  Recent Chemotherapy: N/A  Use Nursing Home Standing Orders: Yes     Mantoux instructions   Give two-step Mantoux (PPD) Per Facility Policy Yes     Follow Up and recommended labs and tests   Per hospice facility     Activity - Up with nursing assistance     DNR/DNI     Advance Diet as Tolerated   Follow this diet upon discharge: Orders Placed This Encounter     Advance Diet as Tolerated: Clear Liquid Diet       Discharge Medications   Discharge Medication List as of 8/18/2017  9:28 AM      START taking these medications    Details   MEDICATION INSTRUCTION If care facility cannot accept or use ranges, facility is instructed to use lower end of dosing range, Transitional      HYDROmorphone (DILAUDID HIGH CONCENTRATION) 10 mg/mL LIQD (HIGH CONC) solution Take 0.1-0.2 mLs (1-2 mg) by mouth or place under tongue every 2 hours as needed for moderate to severe pain (and/or  shortness of breath), Disp-30 mL, R-0, Transitional      atropine 1 % ophthalmic solution Take 2-4 drops by mouth, place under tongue or place inside cheek every 2 hours as needed for other (terminal respiratory secretions) Not for ophthalmic use., Disp-5 mL, R-1, Transitional      LORazepam (ATIVAN) 2 MG/ML (HIGH CONC) solution Take 0.13-0.25 mLs (0.26-0.5 mg) by mouth, place under tongue or insert rectally every 4 hours as needed for anxiety (restlessness), Disp-30 mL, R-1, Transitional      haloperidol (HALDOL) 2 MG/ML (HIGH CONC) solution Take 0.25-0.5 mLs (0.5-1 mg) by mouth, place under tongue or insert rectally every 6 hours as needed for agitation (nausea), Disp-30 mL, R-1, Transitional      acetaminophen (TYLENOL) 650 MG Suppository Place 1 suppository (650 mg) rectally every 4 hours as needed for fever or mild pain (Do not exceed 4000 mg total acetaminophen per day.) Unwrap prior to insertion., Disp-12 suppository, R-1,  Transitional      acetaminophen (TYLENOL) 325 MG tablet Take 1-2 tablets (325-650 mg) by mouth every 4 hours as needed for mild pain or fever, Disp-100 tablet, R-1, Transitional         CONTINUE these medications which have NOT CHANGED    Details   FUROSEMIDE PO Take 20 mg by mouth daily, Historical      triamcinolone (KENALOG) 0.1 % cream Apply topically 3 times daily as needed for irritation (legs)Historical      albuterol (PROAIR HFA/PROVENTIL HFA/VENTOLIN HFA) 108 (90 BASE) MCG/ACT Inhaler Inhale 2 puffs into the lungs every 6 hours as needed for shortness of breath / dyspnea or wheezing, Disp-1 Inhaler, R-0, E-Prescribe      order for DME Equipment being ordered: MD2 Medication DispenserDisp-1 Device, R-0, Local Print         STOP taking these medications       METOPROLOL TARTRATE PO Comments:   Reason for Stopping:         aspirin 81 MG tablet Comments:   Reason for Stopping:         atorvastatin (LIPITOR) 40 MG tablet Comments:   Reason for Stopping:         diltiazem (CARDIZEM SR) 60 MG 12 hr capsule Comments:   Reason for Stopping:         Multiple Vitamins-Minerals (CENTRUM SILVER) per tablet Comments:   Reason for Stopping:             Allergies   Allergies   Allergen Reactions     Amoxicillin      Sulfa Drugs      Data   Results for orders placed or performed during the hospital encounter of 08/15/17   CT Head w/o Contrast    Narrative    CT HEAD W/O CONTRAST  8/15/2017 10:54 PM    HISTORY: Slurred speech. Patient fell. Possible stroke.    TECHNIQUE: Scans were obtained through the head without IV contrast.   Radiation dose for this scan was reduced using automated exposure  control, adjustment of the mA and/or kV according to patient size, or  iterative reconstruction technique.    COMPARISON: None.    FINDINGS: Atrophy. Moderate low-density change in the white matter  both hemispheres consistent with chronic small vessel ischemic  disease.  No hemorrhage, mass lesion, or focal area of acute  infarction  identified. Paranasal sinuses are normal. No bony  abnormality.      Impression    IMPRESSION:   1. Atrophy and chronic white matter disease.  2. Nothing acute.  3. CT angiogram to follow.    DAWSON BENTON MD   CT Head w Contrast    Narrative    CT ANGIOGRAM OF THE HEAD AND NECK WITHOUT AND WITH CONTRAST  8/15/2017  11:13 PM     HISTORY: Slurred speech and right-sided gaze. Patient fell. Code  stroke. Chronic atrial fibrillation. Pronounced left facial droop.    TECHNIQUE:  Precontrast localizing scans were followed by CT  angiography with an injection of 70 mL Isovue-370 (accession  [id number]), 50 mL Isovue-370 (accession [id number]) IV with scans  through the head and neck.  Images were transferred to a separate 3-D  workstation where multiplanar reformations and 3-D images were  created.  Estimates of carotid stenoses are made relative to the  distal internal carotid artery diameters except as noted. Radiation  dose for this scan was reduced using automated exposure control,  adjustment of the mA and/or kV according to patient size, or iterative  reconstruction technique.  Perfusion scans were performed at three  levels with injection of an additional 40 mL IV nonionic contrast and  20 mL saline flush.  These images were processed on a separate 3-D  workstation.    COMPARISON: CT head today.    CT HEAD FINDINGS:  No contrast enhancing lesions.  Perfusion CT scan  shows markedly decreased blood flow to the lateral aspect of the right  cerebral hemisphere in the right middle cerebral artery territory.  Color map images could not be produced because of motion artifacts.    CT ANGIOGRAM HEAD FINDINGS:  There is occlusion of the right middle  cerebral artery at the end of the M1 segment. There is diminished flow  in the right internal carotid artery distally, probably from lack of  distal runoff. Right anterior cerebral artery A1 segment is threadlike  and the A2 segment is small. Multiple possible intracranial  stenoses  are seen in the left anterior and middle cerebral arteries, the  basilar artery and throughout the posterior cerebral arteries,  although some of these may be motion artifacts. There is extensive  calcified atherosclerotic plaque in the carotid siphons. Thin section  images raise the possibility of a thrombus in the right carotid siphon  versus noncalcified atherosclerotic plaque. Venous circulation is  unremarkable.     CT ANGIOGRAM NECK FINDINGS:   Right carotid artery: Mild calcified atherosclerotic plaque in the  proximal internal carotid artery.  No significant stenosis.      Left carotid artery: Minimal calcified plaque in the distal common  carotid and proximal internal carotid arteries.  No significant  stenosis.      Vertebral arteries:  Calcified plaque right distal vertebral artery V4  segment causing mild stenosis.  No other stenosis.      Other findings: Thyroid goiter, larger on the right than on the left.      Impression    IMPRESSION:   1. 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  2. Multiple bilateral intracranial arterial stenoses in all  circulations.  3. Extensive plaque in the carotid siphons causing moderate to severe  stenoses, best appreciated on axial thin section images. Cannot rule  out a thrombus in the right carotid siphon.    I agree with the preliminary report that was communicated to the  referring physician.    KANG OHARA MD   CTA Angiogram Head Neck    Narrative    CT ANGIOGRAM OF THE HEAD AND NECK WITHOUT AND WITH CONTRAST  8/15/2017  11:13 PM     HISTORY: Slurred speech and right-sided gaze. Patient fell. Code  stroke. Chronic atrial fibrillation. Pronounced left facial droop.    TECHNIQUE:  Precontrast localizing scans were followed by CT  angiography with an injection of 70 mL Isovue-370 (accession  [id number]), 50 mL Isovue-370 (accession [id number]) IV with scans  through the head and neck.  Images were transferred to a separate  3-D  workstation where multiplanar reformations and 3-D images were  created.  Estimates of carotid stenoses are made relative to the  distal internal carotid artery diameters except as noted. Radiation  dose for this scan was reduced using automated exposure control,  adjustment of the mA and/or kV according to patient size, or iterative  reconstruction technique.  Perfusion scans were performed at three  levels with injection of an additional 40 mL IV nonionic contrast and  20 mL saline flush.  These images were processed on a separate 3-D  workstation.    COMPARISON: CT head today.    CT HEAD FINDINGS:  No contrast enhancing lesions.  Perfusion CT scan  shows markedly decreased blood flow to the lateral aspect of the right  cerebral hemisphere in the right middle cerebral artery territory.  Color map images could not be produced because of motion artifacts.    CT ANGIOGRAM HEAD FINDINGS:  There is occlusion of the right middle  cerebral artery at the end of the M1 segment. There is diminished flow  in the right internal carotid artery distally, probably from lack of  distal runoff. Right anterior cerebral artery A1 segment is threadlike  and the A2 segment is small. Multiple possible intracranial stenoses  are seen in the left anterior and middle cerebral arteries, the  basilar artery and throughout the posterior cerebral arteries,  although some of these may be motion artifacts. There is extensive  calcified atherosclerotic plaque in the carotid siphons. Thin section  images raise the possibility of a thrombus in the right carotid siphon  versus noncalcified atherosclerotic plaque. Venous circulation is  unremarkable.     CT ANGIOGRAM NECK FINDINGS:   Right carotid artery: Mild calcified atherosclerotic plaque in the  proximal internal carotid artery.  No significant stenosis.      Left carotid artery: Minimal calcified plaque in the distal common  carotid and proximal internal carotid arteries.  No  significant  stenosis.      Vertebral arteries:  Calcified plaque right distal vertebral artery V4  segment causing mild stenosis.  No other stenosis.      Other findings: Thyroid goiter, larger on the right than on the left.      Impression    IMPRESSION:   1. 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  2. Multiple bilateral intracranial arterial stenoses in all  circulations.  3. Extensive plaque in the carotid siphons causing moderate to severe  stenoses, best appreciated on axial thin section images. Cannot rule  out a thrombus in the right carotid siphon.    I agree with the preliminary report that was communicated to the  referring physician.    KANG OHARA MD

## 2024-06-17 PROBLEM — Z71.89 ADVANCED DIRECTIVES, COUNSELING/DISCUSSION: Status: RESOLVED | Noted: 2017-01-01 | Resolved: 2024-06-17

## 2024-09-13 NOTE — PROGRESS NOTES
Hospice: Met with patients son Damian and spouse Noni to talk about hospice services. They had a prior hospice consult so are familiar with the program and services. Pt is currently at [facility] and have been happy with the care there but worry that they may not be able to provide end of life care to their satisfaction. They asked me about [facility] hospice and I stated we would need to see if there was a bed available and the  Vianney would make that call. Their second choice is [facility] who may have a bed. The plan would be if [facility] does not have a bed I would meet again with the son to sign hospice consent forms for  Hospice and the pt going to [facility]. I will fax information over to [facility]. Thank you for the referral. Sylvia Escobedo RN, BSN   Hospice Admission nurse  [phone]  
Brief Hospitalist Update Note    No charge note, patient admitted early this AM by Dr. Roth.  Resting comfortably in bed, social work working on placement, family without concerns.  Continue comfort measures.  
Care Transition Initial Assessment - LALI  Reason For Consult: end of life/hospice  Met with: FAMILY   Active Problems:    Stroke (H)         DATA  Lives With: facility resident  Living Arrangements: assisted living  Description of Support System: Supportive, Involved  Who is your support system?: Children  Support Assessment: Adequate family and caregiver support.   Identified issues/concerns regarding health management: Patient will need hospice at d/c  Quality Of Family Relationships: supportive, involved    ASSESSMENT  Cognitive Status:  Alert and oriented  Concerns to be addressed: Patient is a 89 year old female who was admitted to the hospital for a stroke. Prior to hospitalization patient was living at [facility] where she was managing well. Patient has made it known to family and medical staff that she would like to sign on to hospice. LALI called patient's son, Jorge L, to discuss patient's d/c plan. Patient's son is in agreement with patient's plan and is hoping she can go back to [facility]. LALI has a call out to staff to discuss patient's d/c plan. Patient has previously had a hospice consult with  hospice and feels they do not need another one but would like to sign consents today. LALI has a call out to Sylvia from  hospice to see if she has a 1300 available. LALI awaits a call back. delphine Correa will plan to arrive at hospital around 1300.     PLAN  Financial costs for the patient includes  Patient given options and choices for discharge with hospice  Patient/family is agreeable to the plan?  YES  Patient Goals and Preferences:  hospice  Patient anticipates discharging to:  [facility]    Vianney Mckeon, MOISES   *[phone]            
LALI  I: LALI emailed Serena from [facility] requesting bed availability. LALI also called [facility] to check bed availability. LALI spoke with [facility] who stated they may have a bed available shortly but at this moment nothing is available. Marium confirmed information was sent already on patient and will update when bed is available (anticipating tomorrow). LALI will update patient/family.    P: LALI will continue to follow and assist as needed.    ADDENDUM  I: [facility] emailed LALI and stated they would potentially have a bed for Friday or next week. LALI will fax application and awaits call back after assessment.    Vianney Mckeon, MOISES   *[phone]    
LALI  I: LALI faxed orders to [facility] @ 037    P: LALI will continue to follow and assist as needed.    Vianney Mckeon, LSW   *[phone]    
LALI  I: LALI spoke with Sylvia from [facility] who updated LALI that [facility] has a bed available for Friday 8/18. Sylvia is updating son. LALI will call family and arrange transportation. LALI will update Serena from [facility]. LALI confirmed no bed was available at [facility] for today. LALI spoke with family to discuss transportation needs. Patient's family would like a stretcher arranged and is ok if patient's receives bill for transport in the case insurance doesn't cover. Patient would require a stretcher ride due to inability to tolerate seated position for length of transport, weakness, hospice. Transportation is arranged for 930 on 8/18. No meds are needed at d/c. PCS faxed and given to [facility].    P: LALI will continue to follow and assist as needed.    LALI needs to fax orders to [phone]    MOISES Russell   *[phone]    
Patient examined, discussed with care staff.    S: Denies pain currently, Denies SOB, n/v, f/c.  O: Vital signs reviewed.  Oriented to self, hospital.    Abdomen: soft    A/P:  89F admitted after stroke for initiation of comfort cares and hospice assessment.  Currently comfortable and family is agreeable for transition to hospice.  Orders placed for transfer to hospice facility tomorrow.  
no wheezing/no dyspnea/no cough